# Patient Record
Sex: FEMALE | Race: BLACK OR AFRICAN AMERICAN | Employment: UNEMPLOYED | URBAN - METROPOLITAN AREA
[De-identification: names, ages, dates, MRNs, and addresses within clinical notes are randomized per-mention and may not be internally consistent; named-entity substitution may affect disease eponyms.]

---

## 2017-01-12 ENCOUNTER — OFFICE VISIT (OUTPATIENT)
Dept: SURGERY | Age: 52
End: 2017-01-12

## 2017-01-12 VITALS
HEART RATE: 77 BPM | SYSTOLIC BLOOD PRESSURE: 149 MMHG | DIASTOLIC BLOOD PRESSURE: 78 MMHG | BODY MASS INDEX: 42.33 KG/M2 | WEIGHT: 210 LBS | HEIGHT: 59 IN | OXYGEN SATURATION: 100 %

## 2017-01-12 DIAGNOSIS — I10 ESSENTIAL HYPERTENSION: ICD-10-CM

## 2017-01-12 DIAGNOSIS — K21.9 GASTROESOPHAGEAL REFLUX DISEASE WITHOUT ESOPHAGITIS: ICD-10-CM

## 2017-01-12 DIAGNOSIS — Z98.84 S/P BARIATRIC SURGERY: ICD-10-CM

## 2017-01-12 DIAGNOSIS — R63.5 WEIGHT GAIN: ICD-10-CM

## 2017-01-12 DIAGNOSIS — L67.9 HAIR CHANGES: ICD-10-CM

## 2017-01-12 DIAGNOSIS — K90.9 INTESTINAL MALABSORPTION, UNSPECIFIED TYPE: Primary | ICD-10-CM

## 2017-01-12 NOTE — PATIENT INSTRUCTIONS
Continue to monitor carbohydrate and protein intake. Eat regularly - every 3-4 hours. Add protein shake daily  The best bars are pure protein, atkins, Look for protein grams higher than carb grams. Remember to stay hydrated  Continue to take vitamins  Continue to work towards exercise goals  Continue regular follow-up with PCP  Phone call with NP in 2-3 weeks. Return in 2 months  Call office with any future questions or concerns         Body Mass Index: Care Instructions  Your Care Instructions    Body mass index (BMI) can help you see if your weight is raising your risk for health problems. It uses a formula to compare how much you weigh with how tall you are. A BMI between 18.5 and 24.9 is considered healthy. A BMI between 25 and 29.9 is considered overweight. A BMI of 30 or higher is considered obese. If your BMI is in the normal range, it means that you have a lower risk for weight-related health problems. If your BMI is in the overweight or obese range, you may be at increased risk for weight-related health problems, such as high blood pressure, heart disease, stroke, arthritis or joint pain, and diabetes. BMI is just one measure of your risk for weight-related health problems. You may be at higher risk for health problems if you are not active, you eat an unhealthy diet, or you drink too much alcohol or use tobacco products. Follow-up care is a key part of your treatment and safety. Be sure to make and go to all appointments, and call your doctor if you are having problems. It's also a good idea to know your test results and keep a list of the medicines you take. How can you care for yourself at home? · Practice healthy eating habits. This includes eating plenty of fruits, vegetables, whole grains, lean protein, and low-fat dairy. · Get at least 30 minutes of exercise 5 days a week or more. Brisk walking is a good choice.  You also may want to do other activities, such as running, swimming, cycling, or playing tennis or team sports. · Do not smoke. Smoking can increase your risk for health problems. If you need help quitting, talk to your doctor about stop-smoking programs and medicines. These can increase your chances of quitting for good. · Limit alcohol to 2 drinks a day for men and 1 drink a day for women. Too much alcohol can cause health problems. If you have a BMI higher than 25  · Your doctor may do other tests to check your risk for weight-related health problems. This may include measuring the distance around your waist. A waist measurement of more than 40 inches in men or 35 inches in women can increase the risk of weight-related health problems. · Talk with your doctor about steps you can take to stay healthy or improve your health. You may need to make lifestyle changes to lose weight and stay healthy, such as changing your diet and getting regular exercise. Where can you learn more? Go to http://faisal-mason.info/. Enter S176 in the search box to learn more about \"Body Mass Index: Care Instructions. \"  Current as of: February 16, 2016  Content Version: 11.1  © 8287-1951 [employer]. Care instructions adapted under license by [employer] (which disclaims liability or warranty for this information). If you have questions about a medical condition or this instruction, always ask your healthcare professional. [address] any warranty or liability for your use of this information.

## 2017-01-12 NOTE — PROGRESS NOTES
Subjective:     Nir Riddle  is a 49 y.o. female who presents for follow-up about 8 months following laparoscopic sleeve gastrectomy. Surgery related complication: NA. She has lost a total of 54 pounds since surgery. Body mass index is 42.41 kg/(m^2). Katherene Means Loss of EBW 36%. The patient presents today to assess their progress toward their weight loss goal & to address any issues that may be present:   She has gained 4 lbs since her last office visit on 11/11/16. Reports dietary indiscretion over the holidays. She is tolerating solids without difficulty and denies vomiting and abdominal pain. Concerned with recent hair breaking. Fluid intake:  good  Protein intake:  Poor - about 40g/day. All food  Meals/day:  2. Diet recall estimates calories <600/day. Taking vitamins as recommended. The patient's exercise level: not active. Changes in her medical history and medications have been reviewed:  Continues with left hip and rt knee pain. Uses a cane. Continues in pain management - having nerve blocks which pt states have helped. Routine office visit with PCP yesterday - who also checked TSH because of hair concerns. Remains on HBP rx.   Also with increased stress - mother just had 2 strokes    Patient Active Problem List   Diagnosis Code    Hypertension I10    Hypercholesteremia E78.00    Arthritis of knee, left M19.90    Chronic back pain M54.9, G89.29    Arthritis of right hip M19.90    GERD (gastroesophageal reflux disease) K21.9    Morbid obesity (HCC) E66.01    S/P bariatric surgery Z98.84    Intestinal malabsorption K90.9    Chronic gastritis K29.50    H. pylori infection A04.8     Past Medical History   Diagnosis Date    Arthritis of knee, left     Arthritis of right hip     Chronic back pain     Diabetes mellitus (HCC)     Fibromyalgia     GERD (gastroesophageal reflux disease)     Hypercholesteremia     Hypertension     Intestinal malabsorption     Morbid obesity ([facility]     Morbid obesity with body mass index of 50.0-59.9 in adult [facility])     Status post bariatric surgery 2016     sleeve resection / terracina     Past Surgical History   Procedure Laterality Date    Hx gyn       RICCI    Hx gyn       c section x 3    Hx hernia repair       In  incision with mesh    Laparoscopy abdomen diagnostic       aborted gastric bypass / terracina    Hx gi  2016     sleeve resection / terracina     Current Outpatient Prescriptions   Medication Sig Dispense Refill    TRAMADOL HCL (TRAMADOL PO) Take 50 mg by mouth.  b complex vitamins (B-50 COMPLEX) tablet Take 1 Tab by mouth daily.  Biotin 2,500 mcg cap Take  by mouth.  losartan-hydrochlorothiazide (HYZAAR) 100-25 mg per tablet Take 1 Tab by mouth daily.  cyanocobalamin (VITAMIN B-12) 1,000 mcg sublingual tablet Take 1,000 mcg by mouth daily.  calcium citrate-vitamin d3 (CITRACAL+D) 315-200 mg-unit tab Take 1 Tab by mouth daily (with breakfast).  gabapentin (NEURONTIN) 300 mg capsule Take 600 mg by mouth three (3) times daily.  simvastatin (ZOCOR) 20 mg tablet Take 20 mg by mouth nightly. Indications: HYPERCHOLESTEROLEMIA      multivitamin with iron (FLINTSTONES) chewable tablet Take 2 Tabs by mouth daily. Indications: VITAMIN DEFICIENCY PREVENTION      cholecalciferol (VITAMIN D3) 1,000 unit cap Take  by mouth daily.          Review of Systems:  General - Denies fatigue, fever, chills  Cardiac - Denies chest pain, palpitations, shortness of breath  Pulmonary - Denies shortness of breath or productive cough  Gastrointestinal - as noted above  Musculoskeletal - as noted above  Hematologic - Denies abnormal bleeding or bruising  Neurologic - Denies paralysis, numbness, tingling    Objective:     Visit Vitals    /78 (BP 1 Location: Left arm, BP Patient Position: Sitting)    Pulse 77    Ht 4' 11\" (1.499 m)    Wt 95.3 kg (210 lb)    SpO2 100%    BMI 42.41 kg/m2        Physical Exam:    General:  alert, cooperative, no distress, appears stated age   Lungs:   clear to auscultation bilaterally   Heart:  Regular rate and rhythm   Abdomen:   abdomen is soft without tenderness, masses, organomegaly or guarding; Active bowel sounds all 4 quadrants. Large hanging abdominal pannus. Incisions:  healed         Labs:    Previously reviewed with pt - all WNL. Recent Results (from the past 2016 hour(s))   VITAMIN B12    Collection Time: 11/11/16  8:15 AM   Result Value Ref Range    Vitamin B12 1842 (H) 211 - 911 pg/mL   CBC WITH AUTOMATED DIFF    Collection Time: 11/11/16  8:15 AM   Result Value Ref Range    WBC 3.5 (L) 4.6 - 13.2 K/uL    RBC 4.39 4.20 - 5.30 M/uL    HGB 12.1 12.0 - 16.0 g/dL    HCT 37.1 35.0 - 45.0 %    MCV 84.5 74.0 - 97.0 FL    MCH 27.6 24.0 - 34.0 PG    MCHC 32.6 31.0 - 37.0 g/dL    RDW 14.6 (H) 11.6 - 14.5 %    PLATELET 738 609 - 146 K/uL    MPV 11.5 9.2 - 11.8 FL    NEUTROPHILS 36 (L) 40 - 73 %    LYMPHOCYTES 49 21 - 52 %    MONOCYTES 9 3 - 10 %    EOSINOPHILS 4 0 - 5 %    BASOPHILS 2 0 - 2 %    ABS. NEUTROPHILS 1.3 (L) 1.8 - 8.0 K/UL    ABS. LYMPHOCYTES 1.8 0.9 - 3.6 K/UL    ABS. MONOCYTES 0.3 0.05 - 1.2 K/UL    ABS. EOSINOPHILS 0.1 0.0 - 0.4 K/UL    ABS.  BASOPHILS 0.1 (H) 0.0 - 0.06 K/UL    DF AUTOMATED     FERRITIN    Collection Time: 11/11/16  8:15 AM   Result Value Ref Range    Ferritin 48 8 - 388 NG/ML   FOLATE    Collection Time: 11/11/16  8:15 AM   Result Value Ref Range    Folate >20.0 (H) 3.10 - 17.50 ng/mL   IRON    Collection Time: 11/11/16  8:15 AM   Result Value Ref Range    Iron 92 50 - 577 ug/dL   METABOLIC PANEL, COMPREHENSIVE    Collection Time: 11/11/16  8:15 AM   Result Value Ref Range    Sodium 144 136 - 145 mmol/L    Potassium 4.6 3.5 - 5.5 mmol/L    Chloride 105 100 - 108 mmol/L    CO2 33 (H) 21 - 32 mmol/L    Anion gap 6 3.0 - 18 mmol/L    Glucose 97 74 - 99 mg/dL    BUN 7 7.0 - 18 MG/DL    Creatinine 0.81 0.6 - 1.3 MG/DL    BUN/Creatinine ratio 9 (L) 12 - 20      GFR est AA >60 >60 ml/min/1.73m2    GFR est non-AA >60 >60 ml/min/1.73m2    Calcium 9.8 8.5 - 10.1 MG/DL    Bilirubin, total 0.6 0.2 - 1.0 MG/DL    ALT 19 13 - 56 U/L    AST 20 15 - 37 U/L    Alk. phosphatase 79 45 - 117 U/L    Protein, total 7.2 6.4 - 8.2 g/dL    Albumin 3.9 3.4 - 5.0 g/dL    Globulin 3.3 2.0 - 4.0 g/dL    A-G Ratio 1.2 0.8 - 1.7     VITAMIN B1, WHOLE BLOOD    Collection Time: 11/11/16  8:15 AM   Result Value Ref Range    Vitamin B1 164.8 66.5 - 200.0 nmol/L   VITAMIN D, 25 HYDROXY    Collection Time: 11/11/16  8:16 AM   Result Value Ref Range    Vitamin D 25-Hydroxy 43.6 30 - 100 ng/mL         Assessment:     1. History of Morbid obesity, status post  laparoscopic sleeve gastrectomy with recent wt gain due to dietary issues. 2. HBP - on rx  3. GERD - asymptomatic off omeprazole. 4.  Protein calorie deficit  5. Hair breaking    Plan:       1. Today the patient & I have reviewed their weight loss and their diet - including how appropriate their weight loss and food choices are. Discussed factors contributing to her wt gain - including her diet and stress  2. To continue focus on hydration. 3. Discussed her hair issues and probable relationship with poor diet. To continue fu with PCP  4. Discussed protein calorie deficit at length with pt. Reminded to measure portions, continue high protein,low carbohydrate diet. Reminded to eat regularly. To add protein shake daily. Handouts given. Pt verbalized understanding of diet. Dietician support offered. 5. Reminded of importance of vitamin supplements. 6. To continue to increase activity. 7. Encouraged to attend support group. 8. To continue current rx. To continue follow-up with PCP, specialists. 9. I have discussed the plan and education material with patient. Pt verbalized understanding. 10. TC with NP in 2-3 weeks. Follow-up in 2 month(s). To call if questions/concerns prior to next office visit  11.  Total time spent with pt - 30 minutes with 20 minutes spent in counseling. Ms. Adenike Paulino has a reminder for a \"due or due soon\" health maintenance. I have asked that she contact her primary care provider for follow-up on this health maintenance.

## 2017-01-12 NOTE — MR AVS SNAPSHOT
Visit Information Date & Time Provider Department Dept. Phone Encounter #  
 1/12/2017  9:00 AM Brent Turpin NP [facility] [id number] Follow-up Instructions Return in about 2 months (around 3/12/2017). Your Appointments 1/24/2017 11:00 AM  
Follow Up with Brent Turpin NP [facility] [place] ([facility]) Appt Note: f/u phone [phone] [facility] [address] [facility] [id number]  
[phone]  
  
   
 [address] [address]  
  
    
 3/14/2017  9:00 AM  
Office Visit with Brent Turpin NP [facility] [place] ([facility]) Appt Note: f/u  
 [address] [facility] [address] [facility] [address]  
  
   
 [address] [place] [address] Upcoming Health Maintenance Date Due  
 LIPID PANEL Q1 1966 FOOT EXAM Q1 10/22/1976 MICROALBUMIN Q1 10/22/1976 EYE EXAM RETINAL OR DILATED Q1 10/22/1976 DTaP/Tdap/Td series (1 - Tdap) 10/22/1987 PAP AKA CERVICAL CYTOLOGY 10/22/1987 INFLUENZA AGE 9 TO ADULT 8/1/2016 HEMOGLOBIN A1C Q6M 10/11/2016 BREAST CANCER SCRN MAMMOGRAM 10/22/2016 FOBT Q 1 YEAR AGE 50-75 10/22/2016 Allergies as of 1/12/2017  Review Complete On: 1/12/2017 By: Israel Jeong LPN Severity Noted Reaction Type Reactions Aspirin  04/05/2016    Rash Vicodin [Hydrocodone-acetaminophen]  09/23/2015    Hives Current Immunizations  Reviewed on 5/19/2016 Name Date Pneumococcal Vaccine (Unspecified Type) 10/1/2015 Not reviewed this visit You Were Diagnosed With   
  
 Codes Comments Intestinal malabsorption, unspecified type    -  Primary ICD-10-CM: K90.9 ICD-9-CM: 579.9 S/P bariatric surgery     ICD-10-CM: Z98.84 ICD-9-CM: V45.86 Essential hypertension     ICD-10-CM: I10 
ICD-9-CM: 401.9 Gastroesophageal reflux disease without esophagitis     ICD-10-CM: K21.9 ICD-9-CM: 530.81 Weight gain     ICD-10-CM: R63.5 ICD-9-CM: 783.1 Vitals BP Pulse Height(growth percentile) Weight(growth percentile) SpO2 BMI  
 149/78 (BP 1 Location: Left arm, BP Patient Position: Sitting) 77 4' 11\" (1.499 m) 210 lb (95.3 kg) 100% 42.41 kg/m2 OB Status Smoking Status Hysterectomy Former Smoker BMI and BSA Data Body Mass Index Body Surface Area  
 42.41 kg/m 2 1.99 m 2 Preferred Pharmacy Pharmacy Name Phone [id number], [address] [phone] Your Updated Medication List  
  
   
This list is accurate as of: 1/12/17  9:22 AM.  Always use your most recent med list.  
  
  
  
  
 B-50 COMPLEX tablet Generic drug:  b complex vitamins Take 1 Tab by mouth daily. Biotin 2,500 mcg Cap Take  by mouth.  
  
 calcium citrate-vitamin d3 315-200 mg-unit Tab Commonly known as:  CITRACAL+D Take 1 Tab by mouth daily (with breakfast). cyanocobalamin 1,000 mcg sublingual tablet Commonly known as:  VITAMIN B-12 Take 1,000 mcg by mouth daily. docusate sodium 100 mg capsule Commonly known as:  Joann Venessa Take 100 mg by mouth two (2) times a day.  
  
 gabapentin 300 mg capsule Commonly known as:  NEURONTIN Take 600 mg by mouth three (3) times daily. losartan-hydroCHLOROthiazide 100-25 mg per tablet Commonly known as:  HYZAAR Take 1 Tab by mouth daily. multivitamin with iron chewable tablet Commonly known as:  Yulia Damion Take 2 Tabs by mouth daily. Indications: VITAMIN DEFICIENCY PREVENTION  
  
 omeprazole 20 mg capsule Commonly known as:  PRILOSEC Take 20 mg by mouth daily. Indications: GASTROESOPHAGEAL REFLUX  
  
 oxyCODONE-acetaminophen  mg per tablet Commonly known as:  PERCOCET 10 Take 1 Tab by mouth as needed. simvastatin 20 mg tablet Commonly known as:  ZOCOR Take 20 mg by mouth nightly. Indications: HYPERCHOLESTEROLEMIA TRAMADOL PO Take 50 mg by mouth. VITAMIN D3 1,000 unit Cap Generic drug:  cholecalciferol Take  by mouth daily. Follow-up Instructions Return in about 2 months (around 3/12/2017). Patient Instructions Continue to monitor carbohydrate and protein intake. Eat regularly - every 3-4 hours. Add protein shake daily The best bars are pure protein, atkins, Look for protein grams higher than carb grams. Remember to stay hydrated Continue to take vitamins Continue to work towards exercise goals Continue regular follow-up with PCP Phone call with NP in 2-3 weeks. Return in 2 months Call office with any future questions or concerns Body Mass Index: Care Instructions Your Care Instructions Body mass index (BMI) can help you see if your weight is raising your risk for health problems. It uses a formula to compare how much you weigh with how tall you are. A BMI between 18.5 and 24.9 is considered healthy. A BMI between 25 and 29.9 is considered overweight. A BMI of 30 or higher is considered obese. If your BMI is in the normal range, it means that you have a lower risk for weight-related health problems. If your BMI is in the overweight or obese range, you may be at increased risk for weight-related health problems, such as high blood pressure, heart disease, stroke, arthritis or joint pain, and diabetes. BMI is just one measure of your risk for weight-related health problems. You may be at higher risk for health problems if you are not active, you eat an unhealthy diet, or you drink too much alcohol or use tobacco products. Follow-up care is a key part of your treatment and safety. Be sure to make and go to all appointments, and call your doctor if you are having problems. It's also a good idea to know your test results and keep a list of the medicines you take. How can you care for yourself at home? · Practice healthy eating habits. This includes eating plenty of fruits, vegetables, whole grains, lean protein, and low-fat dairy. · Get at least 30 minutes of exercise 5 days a week or more. Brisk walking is a good choice. You also may want to do other activities, such as running, swimming, cycling, or playing tennis or team sports. · Do not smoke. Smoking can increase your risk for health problems. If you need help quitting, talk to your doctor about stop-smoking programs and medicines. These can increase your chances of quitting for good. · Limit alcohol to 2 drinks a day for men and 1 drink a day for women. Too much alcohol can cause health problems. If you have a BMI higher than 25 · Your doctor may do other tests to check your risk for weight-related health problems. This may include measuring the distance around your waist. A waist measurement of more than 40 inches in men or 35 inches in women can increase the risk of weight-related health problems. · Talk with your doctor about steps you can take to stay healthy or improve your health. You may need to make lifestyle changes to lose weight and stay healthy, such as changing your diet and getting regular exercise. Where can you learn more? Go to http://faisal-mason.info/. Enter S176 in the search box to learn more about \"Body Mass Index: Care Instructions. \" Current as of: February 16, 2016 Content Version: 11.1 © 5499-9696 [employer]. Care instructions adapted under license by [employer] (which disclaims liability or warranty for this information). If you have questions about a medical condition or this instruction, always ask your healthcare professional. [address] any warranty or liability for your use of this information. Introducing [facility]! Dear Jose Polo: Thank you for requesting a Room Choice account. Our records indicate that you already have an active Room Choice account. You can access your account anytime at [email or web address] Did you know that you can access your hospital and ER discharge instructions at any time in Room Choice? You can also review all of your test results from your hospital stay or ER visit. Additional Information If you have questions, please visit the Frequently Asked Questions section of the Room Choice website at [email or web address] Remember, Room Choice is NOT to be used for urgent needs. For medical emergencies, dial 911. Now available from your iPhone and Android! Please provide this summary of care documentation to your next provider. Your primary care clinician is listed as Cris Escamilla. If you have any questions after today's visit, please call [phone].

## 2017-01-31 ENCOUNTER — TELEPHONE (OUTPATIENT)
Dept: SURGERY | Age: 52
End: 2017-01-31

## 2017-01-31 NOTE — TELEPHONE ENCOUNTER
TC to pt who is 9m s/p sleeve gastrectomy as follow-up to OV 1/12/17 where pt had protein calorie deficit. 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 Taking vitamins as recommended. PLAN:  To continue plan as outlined at last office visit   To keep upcoming appt 3/14/17   To call if questions/concerns prior to appt.   Carlee LADD

## 2017-03-14 ENCOUNTER — OFFICE VISIT (OUTPATIENT)
Dept: SURGERY | Age: 52
End: 2017-03-14

## 2017-03-14 DIAGNOSIS — I10 ESSENTIAL HYPERTENSION: ICD-10-CM

## 2017-03-14 DIAGNOSIS — K90.9 INTESTINAL MALABSORPTION, UNSPECIFIED TYPE: Primary | ICD-10-CM

## 2017-03-14 DIAGNOSIS — Z98.84 S/P BARIATRIC SURGERY: ICD-10-CM

## 2017-03-14 NOTE — PATIENT INSTRUCTIONS
Continue to monitor carbohydrate and protein intake. Eat regularly. Remember to stay hydrated  Continue to take vitamins  Continue to work towards exercise goals  Continue regular follow-up with PCP  Return in 3 months. Get labs prior to next office visit. Call office with any future questions or concerns         Body Mass Index: Care Instructions  Your Care Instructions    Body mass index (BMI) can help you see if your weight is raising your risk for health problems. It uses a formula to compare how much you weigh with how tall you are. A BMI between 18.5 and 24.9 is considered healthy. A BMI between 25 and 29.9 is considered overweight. A BMI of 30 or higher is considered obese. If your BMI is in the normal range, it means that you have a lower risk for weight-related health problems. If your BMI is in the overweight or obese range, you may be at increased risk for weight-related health problems, such as high blood pressure, heart disease, stroke, arthritis or joint pain, and diabetes. BMI is just one measure of your risk for weight-related health problems. You may be at higher risk for health problems if you are not active, you eat an unhealthy diet, or you drink too much alcohol or use tobacco products. Follow-up care is a key part of your treatment and safety. Be sure to make and go to all appointments, and call your doctor if you are having problems. It's also a good idea to know your test results and keep a list of the medicines you take. How can you care for yourself at home? · Practice healthy eating habits. This includes eating plenty of fruits, vegetables, whole grains, lean protein, and low-fat dairy. · Get at least 30 minutes of exercise 5 days a week or more. Brisk walking is a good choice. You also may want to do other activities, such as running, swimming, cycling, or playing tennis or team sports. · Do not smoke. Smoking can increase your risk for health problems.  If you need help quitting, talk to your doctor about stop-smoking programs and medicines. These can increase your chances of quitting for good. · Limit alcohol to 2 drinks a day for men and 1 drink a day for women. Too much alcohol can cause health problems. If you have a BMI higher than 25  · Your doctor may do other tests to check your risk for weight-related health problems. This may include measuring the distance around your waist. A waist measurement of more than 40 inches in men or 35 inches in women can increase the risk of weight-related health problems. · Talk with your doctor about steps you can take to stay healthy or improve your health. You may need to make lifestyle changes to lose weight and stay healthy, such as changing your diet and getting regular exercise. Where can you learn more? Go to http://faisal-mason.info/. Enter S176 in the search box to learn more about \"Body Mass Index: Care Instructions. \"  Current as of: February 16, 2016  Content Version: 11.1  © 8819-3744 [employer]. Care instructions adapted under license by [employer] (which disclaims liability or warranty for this information). If you have questions about a medical condition or this instruction, always ask your healthcare professional. [address] any warranty or liability for your use of this information.

## 2017-03-14 NOTE — PROGRESS NOTES
Subjective:   Lee Calderon  is a 49 y.o. female who presents for follow-up about 10 months following laparoscopic sleeve gastrectomy. Surgery related complication: NA. She has lost a total of 57 pounds since surgery. Body mass index is 41.81 kg/(m^2). Loss of EBW is 38%. The patient presents today to assess their progress toward their weight loss goal & to address any issues that may be present:  She has lost 3 lbs since her last office visit 1/12/17. She is tolerating solids without difficulty and denies vomiting and abdominal pain. Fluid intake:  good                                                Protein intake: good - food + occasional protein shakes  Meals/day: 3+. Much improved over last office visit. Taking vitamins as recommended. The patient's exercise level: not active due to pain from left hip, rt knee, fibromyalgia. Uses a cane. Changes in her medical history and medications have been reviewed:  Had appt with pain management last week, has appt with foot/ankle specialist today, has upcoming appt rheumatologist next month. Remains under increased stress - her Mother is now in rehab after 2 strokes. Reports recent OV PCP. States TSH level WNL.     Patient Active Problem List   Diagnosis Code    Hypertension I10    Hypercholesteremia E78.00    Arthritis of knee, left M19.90    Chronic back pain M54.9, G89.29    Arthritis of right hip M19.90    GERD (gastroesophageal reflux disease) K21.9    Morbid obesity (HCC) E66.01    S/P bariatric surgery Z98.84    Intestinal malabsorption K90.9    Chronic gastritis K29.50    H. pylori infection A04.8     Past Medical History:   Diagnosis Date    Arthritis of knee, left     Arthritis of right hip     Chronic back pain     Diabetes mellitus (HCC)     Fibromyalgia     GERD (gastroesophageal reflux disease)     Hypercholesteremia     Hypertension     Intestinal malabsorption     Morbid obesity ([facility]     Morbid obesity with body mass index of 50.0-59.9 in adult [facility])     Status post bariatric surgery 2016    sleeve resection / terracina     Past Surgical History:   Procedure Laterality Date    HX GI  2016    sleeve resection / terracina    HX GYN      RICCI    HX GYN      c section x 3    HX HERNIA REPAIR      In  incision with mesh    LAPAROSCOPY ABDOMEN DIAGNOSTIC      aborted gastric bypass / terracina     Current Outpatient Prescriptions   Medication Sig Dispense Refill    TRAMADOL HCL (TRAMADOL PO) Take 50 mg by mouth.  cholecalciferol (VITAMIN D3) 1,000 unit cap Take  by mouth daily.  b complex vitamins (B-50 COMPLEX) tablet Take 1 Tab by mouth daily.  losartan-hydrochlorothiazide (HYZAAR) 100-25 mg per tablet Take 1 Tab by mouth daily.  cyanocobalamin (VITAMIN B-12) 1,000 mcg sublingual tablet Take 1,000 mcg by mouth daily.  calcium citrate-vitamin d3 (CITRACAL+D) 315-200 mg-unit tab Take 1 Tab by mouth daily (with breakfast).  gabapentin (NEURONTIN) 300 mg capsule Take 600 mg by mouth three (3) times daily.  multivitamin with iron (FLINTSTONES) chewable tablet Take 2 Tabs by mouth daily. Indications: VITAMIN DEFICIENCY PREVENTION      Biotin 2,500 mcg cap Take  by mouth. Review of Systems:  General - Denies fever or chills  Cardiac - Denies chest pain, palpitations or shortness of breath  Pulmonary - Denies shortness of breath or productive cough  Gastrointestinal - as noted above  Musculoskeletal - + generalized pain - especially left hip, rt knee. Hematologic - Denies abnormal bleeding or bruising  Neurologic -Recent increased fibromyalgia symptoms.   Denies paralysis, numbness, tingling    Objective:     Visit Vitals    /88 (BP 1 Location: Right arm, BP Patient Position: Sitting)    Pulse (!) 110    Ht 4' 11\" (1.499 m)    Wt 93.9 kg (207 lb)    SpO2 100%    BMI 41.81 kg/m2        Physical Exam:    General:  alert, cooperative, no distress, appears stated age   Lungs:   clear to auscultation bilaterally   Heart:  Regular rate and rhythm   Abdomen:   abdomen is soft without tenderness, masses, organomegaly or guarding; Active bowel sounds all 4 quadrants. Incisions:  healed         Assessment:     1. History of Morbid obesity, status post  laparoscopic sleeve gastrectomy with slow, but improving wt loss. 2. HBP - on rx    Plan:       1. Today the patient & I have reviewed their weight loss and their diet - including how appropriate their weight loss and food choices are. Discussed strategies that are slowing wt loss & strategies to promote additional wt loss. 2. To continue focus on hydration. 3. Reminded to measure portions, continue high protein, low carbohydrate diet. Reminded to eat regularly, to eat slowly & not to drink with meals. Pt verbalized understanding of diet. Reminded of availability of dietician support. 4.  Reminded of importance of vitamin supplements. 5. To continue to increase activity as able. 6. Encouraged to attend support group. 7. To continue current rx. To continue follow-up with PCP, specialists. 8. Reminded of labs needed @ 1y visit. Lab slip given to pt. 9. I have discussed this plan and education material with patient. Pt verbalized understanding. 10. Follow-up in 3 month(s). To call if questions/concerns prior to office visit. 11. Total time spent with pt - 30 minutes with 20 minutes spent in counseling. Ms. Diaz Espino has a reminder for a \"due or due soon\" health maintenance. I have asked that she contact her primary care provider for follow-up on this health maintenance.

## 2017-03-14 NOTE — MR AVS SNAPSHOT
Visit Information Date & Time Provider Department Dept. Phone Encounter #  
 3/14/2017  9:00 AM Henry Cat NP [employer] [facility] [phone] [id number] Follow-up Instructions Return in about 3 months (around 6/14/2017). Upcoming Health Maintenance Date Due  
 LIPID PANEL Q1 1966 FOOT EXAM Q1 10/22/1976 MICROALBUMIN Q1 10/22/1976 EYE EXAM RETINAL OR DILATED Q1 10/22/1976 DTaP/Tdap/Td series (1 - Tdap) 10/22/1987 PAP AKA CERVICAL CYTOLOGY 10/22/1987 INFLUENZA AGE 9 TO ADULT 8/1/2016 HEMOGLOBIN A1C Q6M 10/11/2016 BREAST CANCER SCRN MAMMOGRAM 10/22/2016 FOBT Q 1 YEAR AGE 50-75 10/22/2016 Allergies as of 3/14/2017  Review Complete On: 3/14/2017 By: Henry Cat NP Severity Noted Reaction Type Reactions Aspirin  04/05/2016    Rash Vicodin [Hydrocodone-acetaminophen]  09/23/2015    Hives Current Immunizations  Reviewed on 5/19/2016 Name Date Pneumococcal Vaccine (Unspecified Type) 10/1/2015 Not reviewed this visit You Were Diagnosed With   
  
 Codes Comments Intestinal malabsorption, unspecified type    -  Primary ICD-10-CM: K90.9 ICD-9-CM: 579.9 S/P bariatric surgery     ICD-10-CM: Z98.84 ICD-9-CM: V45.86 Essential hypertension     ICD-10-CM: I10 
ICD-9-CM: 401.9 Vitals BP Pulse Height(growth percentile) Weight(growth percentile) SpO2 BMI  
 172/88 (BP 1 Location: Right arm, BP Patient Position: Sitting) (!) 110 4' 11\" (1.499 m) 207 lb (93.9 kg) 100% 41.81 kg/m2 OB Status Smoking Status Hysterectomy Former Smoker BMI and BSA Data Body Mass Index Body Surface Area  
 41.81 kg/m 2 1.98 m 2 Preferred Pharmacy Pharmacy Name Phone [id number] [address] [phone] Your Updated Medication List  
  
   
This list is accurate as of: 3/14/17  9:33 AM.  Always use your most recent med list.  
  
  
  
  
 B-50 COMPLEX tablet Generic drug:  b complex vitamins Take 1 Tab by mouth daily. Biotin 2,500 mcg Cap Take  by mouth.  
  
 calcium citrate-vitamin d3 315-200 mg-unit Tab Commonly known as:  CITRACAL+D Take 1 Tab by mouth daily (with breakfast). cyanocobalamin 1,000 mcg sublingual tablet Commonly known as:  VITAMIN B-12 Take 1,000 mcg by mouth daily. gabapentin 300 mg capsule Commonly known as:  NEURONTIN Take 600 mg by mouth three (3) times daily. losartan-hydroCHLOROthiazide 100-25 mg per tablet Commonly known as:  HYZAAR Take 1 Tab by mouth daily. multivitamin with iron chewable tablet Commonly known as:  Ocie [place] Take 2 Tabs by mouth daily. Indications: VITAMIN DEFICIENCY PREVENTION  
  
 TRAMADOL PO Take 50 mg by mouth. VITAMIN D3 1,000 unit Cap Generic drug:  cholecalciferol Take  by mouth daily. Follow-up Instructions Return in about 3 months (around 6/14/2017). To-Do List   
 03/14/2017 Lab:  CBC WITH AUTOMATED DIFF   
  
 03/14/2017 Lab:  FERRITIN   
  
 03/14/2017 Lab:  IRON   
  
 03/14/2017 Lab:  METABOLIC PANEL, COMPREHENSIVE   
  
 03/14/2017 Lab:  VITAMIN B1, WHOLE BLOOD   
  
 03/14/2017 Lab:  VITAMIN B12 & FOLATE Patient Instructions Continue to monitor carbohydrate and protein intake. Eat regularly. Remember to stay hydrated Continue to take vitamins Continue to work towards exercise goals Continue regular follow-up with PCP Return in 3 months. Get labs prior to next office visit. Call office with any future questions or concerns Body Mass Index: Care Instructions Your Care Instructions Body mass index (BMI) can help you see if your weight is raising your risk for health problems. It uses a formula to compare how much you weigh with how tall you are. A BMI between 18.5 and 24.9 is considered healthy.  A BMI between 25 and 29.9 is considered overweight. A BMI of 30 or higher is considered obese. If your BMI is in the normal range, it means that you have a lower risk for weight-related health problems. If your BMI is in the overweight or obese range, you may be at increased risk for weight-related health problems, such as high blood pressure, heart disease, stroke, arthritis or joint pain, and diabetes. BMI is just one measure of your risk for weight-related health problems. You may be at higher risk for health problems if you are not active, you eat an unhealthy diet, or you drink too much alcohol or use tobacco products. Follow-up care is a key part of your treatment and safety. Be sure to make and go to all appointments, and call your doctor if you are having problems. It's also a good idea to know your test results and keep a list of the medicines you take. How can you care for yourself at home? · Practice healthy eating habits. This includes eating plenty of fruits, vegetables, whole grains, lean protein, and low-fat dairy. · Get at least 30 minutes of exercise 5 days a week or more. Brisk walking is a good choice. You also may want to do other activities, such as running, swimming, cycling, or playing tennis or team sports. · Do not smoke. Smoking can increase your risk for health problems. If you need help quitting, talk to your doctor about stop-smoking programs and medicines. These can increase your chances of quitting for good. · Limit alcohol to 2 drinks a day for men and 1 drink a day for women. Too much alcohol can cause health problems. If you have a BMI higher than 25 · Your doctor may do other tests to check your risk for weight-related health problems. This may include measuring the distance around your waist. A waist measurement of more than 40 inches in men or 35 inches in women can increase the risk of weight-related health problems. · Talk with your doctor about steps you can take to stay healthy or improve your health. You may need to make lifestyle changes to lose weight and stay healthy, such as changing your diet and getting regular exercise. Where can you learn more? Go to http://faisal-mason.info/. Enter S176 in the search box to learn more about \"Body Mass Index: Care Instructions. \" Current as of: February 16, 2016 Content Version: 11.1 © 1267-3446 Socialize. Care instructions adapted under license by Hukk[employer] (which disclaims liability or warranty for this information). If you have questions about a medical condition or this instruction, always ask your healthcare professional. [address] any warranty or liability for your use of this information. Introducing [facility] & HEALTH SERVICES! Dear Best Zapata: Thank you for requesting a [employer] account. Our records indicate that you already have an active [employer] account. You can access your account anytime at [email or web address] Did you know that you can access your hospital and ER discharge instructions at any time in [employer]chip? You can also review all of your test results from your hospital stay or ER visit. Additional Information If you have questions, please visit the Frequently Asked Questions section of the [employer] website at [email or web address] Remember, [employer] is NOT to be used for urgent needs. For medical emergencies, dial 911. Now available from your iPhone and Android! Please provide this summary of care documentation to your next provider. Your primary care clinician is listed as Cris Escamilla. If you have any questions after today's visit, please call [phone].

## 2017-03-16 VITALS
OXYGEN SATURATION: 100 % | BODY MASS INDEX: 41.73 KG/M2 | DIASTOLIC BLOOD PRESSURE: 88 MMHG | WEIGHT: 207 LBS | HEIGHT: 59 IN | SYSTOLIC BLOOD PRESSURE: 152 MMHG | HEART RATE: 110 BPM

## 2017-06-06 ENCOUNTER — HOSPITAL ENCOUNTER (OUTPATIENT)
Dept: LAB | Age: 52
Discharge: HOME | End: 2017-06-06
Payer: MEDICARE

## 2017-06-06 DIAGNOSIS — K90.9 INTESTINAL MALABSORPTION, UNSPECIFIED TYPE: ICD-10-CM

## 2017-06-06 DIAGNOSIS — Z98.84 S/P BARIATRIC SURGERY: ICD-10-CM

## 2017-06-06 DIAGNOSIS — I10 ESSENTIAL HYPERTENSION: ICD-10-CM

## 2017-06-06 LAB
ALBUMIN SERPL BCP-MCNC: 3.7 G/DL (ref 3.4–5)
ALBUMIN/GLOB SERPL: 1 {RATIO} (ref 0.8–1.7)
ALP SERPL-CCNC: 109 U/L (ref 45–117)
ALT SERPL-CCNC: 21 U/L (ref 13–56)
ANION GAP BLD CALC-SCNC: 5 MMOL/L (ref 3–18)
AST SERPL W P-5'-P-CCNC: 24 U/L (ref 15–37)
BASOPHILS # BLD AUTO: 0.1 K/UL (ref 0–0.06)
BASOPHILS # BLD: 2 % (ref 0–2)
BILIRUB SERPL-MCNC: 0.6 MG/DL (ref 0.2–1)
BUN SERPL-MCNC: 10 MG/DL (ref 7–18)
BUN/CREAT SERPL: 14 (ref 12–20)
CALCIUM SERPL-MCNC: 9.8 MG/DL (ref 8.5–10.1)
CHLORIDE SERPL-SCNC: 104 MMOL/L (ref 100–108)
CO2 SERPL-SCNC: 35 MMOL/L (ref 21–32)
CREAT SERPL-MCNC: 0.7 MG/DL (ref 0.6–1.3)
DIFFERENTIAL METHOD BLD: ABNORMAL
EOSINOPHIL # BLD: 0.2 K/UL (ref 0–0.4)
EOSINOPHIL NFR BLD: 5 % (ref 0–5)
ERYTHROCYTE [DISTWIDTH] IN BLOOD BY AUTOMATED COUNT: 14.3 % (ref 11.6–14.5)
FERRITIN SERPL-MCNC: 62 NG/ML (ref 8–388)
FOLATE SERPL-MCNC: >20 NG/ML (ref 3.1–17.5)
GLOBULIN SER CALC-MCNC: 3.7 G/DL (ref 2–4)
GLUCOSE SERPL-MCNC: 89 MG/DL (ref 74–99)
HCT VFR BLD AUTO: 38.5 % (ref 35–45)
HGB BLD-MCNC: 12.7 G/DL (ref 12–16)
IRON SERPL-MCNC: 95 UG/DL (ref 50–175)
LYMPHOCYTES # BLD AUTO: 52 % (ref 21–52)
LYMPHOCYTES # BLD: 1.9 K/UL (ref 0.9–3.6)
MCH RBC QN AUTO: 28.3 PG (ref 24–34)
MCHC RBC AUTO-ENTMCNC: 33 G/DL (ref 31–37)
MCV RBC AUTO: 85.7 FL (ref 74–97)
MONOCYTES # BLD: 0.4 K/UL (ref 0.05–1.2)
MONOCYTES NFR BLD AUTO: 10 % (ref 3–10)
NEUTS SEG # BLD: 1.2 K/UL (ref 1.8–8)
NEUTS SEG NFR BLD AUTO: 31 % (ref 40–73)
PLATELET # BLD AUTO: 189 K/UL (ref 135–420)
PMV BLD AUTO: 12.3 FL (ref 9.2–11.8)
POTASSIUM SERPL-SCNC: 4.7 MMOL/L (ref 3.5–5.5)
PROT SERPL-MCNC: 7.4 G/DL (ref 6.4–8.2)
RBC # BLD AUTO: 4.49 M/UL (ref 4.2–5.3)
SODIUM SERPL-SCNC: 144 MMOL/L (ref 136–145)
VIT B12 SERPL-MCNC: >2000 PG/ML (ref 211–911)
WBC # BLD AUTO: 3.7 K/UL (ref 4.6–13.2)

## 2017-06-06 PROCEDURE — 82607 VITAMIN B-12: CPT | Performed by: NURSE PRACTITIONER

## 2017-06-06 PROCEDURE — 85025 COMPLETE CBC W/AUTO DIFF WBC: CPT | Performed by: NURSE PRACTITIONER

## 2017-06-06 PROCEDURE — 84425 ASSAY OF VITAMIN B-1: CPT | Performed by: NURSE PRACTITIONER

## 2017-06-06 PROCEDURE — 82728 ASSAY OF FERRITIN: CPT | Performed by: NURSE PRACTITIONER

## 2017-06-06 PROCEDURE — 36415 COLL VENOUS BLD VENIPUNCTURE: CPT | Performed by: NURSE PRACTITIONER

## 2017-06-06 PROCEDURE — 83540 ASSAY OF IRON: CPT | Performed by: NURSE PRACTITIONER

## 2017-06-06 PROCEDURE — 80053 COMPREHEN METABOLIC PANEL: CPT | Performed by: NURSE PRACTITIONER

## 2017-06-08 LAB — VIT B1 BLD-SCNC: 184.5 NMOL/L (ref 66.5–200)

## 2017-06-08 NOTE — PROGRESS NOTES
1y s/p sleeve - has appt with Dr. Kingsley Benedict 6/15/17. Lela - please call & advise that all labs good & remind her of upcoming appt. Thanks.

## 2017-06-14 ENCOUNTER — OFFICE VISIT (OUTPATIENT)
Dept: SURGERY | Age: 52
End: 2017-06-14

## 2017-06-14 VITALS
BODY MASS INDEX: 40.72 KG/M2 | DIASTOLIC BLOOD PRESSURE: 86 MMHG | HEIGHT: 59 IN | HEART RATE: 70 BPM | RESPIRATION RATE: 16 BRPM | OXYGEN SATURATION: 100 % | SYSTOLIC BLOOD PRESSURE: 144 MMHG | WEIGHT: 202 LBS

## 2017-06-14 DIAGNOSIS — Z98.84 S/P BARIATRIC SURGERY: ICD-10-CM

## 2017-06-14 DIAGNOSIS — K90.9 INTESTINAL MALABSORPTION, UNSPECIFIED TYPE: Primary | ICD-10-CM

## 2017-06-14 RX ORDER — TIZANIDINE 4 MG/1
4 TABLET ORAL DAILY
COMMUNITY
Start: 2017-06-12 | End: 2018-06-22

## 2017-06-14 NOTE — MR AVS SNAPSHOT
Visit Information Date & Time Provider Department Dept. Phone Encounter #  
 6/14/2017  9:15 AM Gwen Guadalupe MD [facility] [phone] [id number] Your Appointments 12/20/2017  8:30 AM  
EST PATIENT PROBLEM with Gwen Guadalupe MD  
[address] [facility]) Appt Note: f/u  
 [address] [place] [address] [facility] [address]  
  
   
 [address] [place] [address] Upcoming Health Maintenance Date Due DTaP/Tdap/Td series (1 - Tdap) 10/22/1987 PAP AKA CERVICAL CYTOLOGY 10/22/1987 BREAST CANCER SCRN MAMMOGRAM 10/22/2016 FOBT Q 1 YEAR AGE 50-75 10/22/2016 INFLUENZA AGE 9 TO ADULT 8/1/2017 Allergies as of 6/14/2017  Review Complete On: 6/14/2017 By: Anjum Mendez Severity Noted Reaction Type Reactions Aspirin  04/05/2016    Rash Vicodin [Hydrocodone-acetaminophen]  09/23/2015    Hives Current Immunizations  Reviewed on 5/19/2016 Name Date Pneumococcal Vaccine (Unspecified Type) 10/1/2015 Not reviewed this visit You Were Diagnosed With   
  
 Codes Comments Intestinal malabsorption, unspecified type    -  Primary ICD-10-CM: K90.9 ICD-9-CM: 579.9 S/P bariatric surgery     ICD-10-CM: Z98.84 ICD-9-CM: V45.86 Vitals BP Pulse Resp Height(growth percentile) Weight(growth percentile) SpO2  
 144/86 (BP 1 Location: Left arm, BP Patient Position: Sitting) 70 16 4' 11\" (1.499 m) 202 lb (91.6 kg) 100% BMI OB Status Smoking Status 40.8 kg/m2 Hysterectomy Former Smoker Vitals History BMI and BSA Data Body Mass Index Body Surface Area  
 40.8 kg/m 2 1.95 m 2 Preferred Pharmacy Pharmacy Name Phone [id number] [address] [phone] Your Updated Medication List  
  
   
 This list is accurate as of: 6/14/17 10:07 AM.  Always use your most recent med list.  
  
  
  
  
 B-50 COMPLEX tablet Generic drug:  b complex vitamins Take 1 Tab by mouth daily. Biotin 2,500 mcg Cap Take  by mouth.  
  
 calcium citrate-vitamin d3 315-200 mg-unit Tab Commonly known as:  CITRACAL+D Take 1 Tab by mouth daily (with breakfast). cyanocobalamin 1,000 mcg sublingual tablet Commonly known as:  VITAMIN B-12 Take 1,000 mcg by mouth daily. gabapentin 300 mg capsule Commonly known as:  NEURONTIN Take 600 mg by mouth three (3) times daily. losartan-hydroCHLOROthiazide 100-25 mg per tablet Commonly known as:  HYZAAR Take 1 Tab by mouth daily. multivitamin with iron chewable tablet Commonly known as:  Yudi Patient Take 2 Tabs by mouth daily. Indications: VITAMIN DEFICIENCY PREVENTION  
  
 tiZANidine 4 mg tablet Commonly known as:  [place]ides Take 4 mg by mouth daily. TRAMADOL PO Take 50 mg by mouth. VITAMIN D3 1,000 unit Cap Generic drug:  cholecalciferol Take  by mouth daily. Patient Instructions Body Mass Index: Care Instructions Your Care Instructions Body mass index (BMI) can help you see if your weight is raising your risk for health problems. It uses a formula to compare how much you weigh with how tall you are. · A BMI lower than 18.5 is considered underweight. · A BMI between 18.5 and 24.9 is considered healthy. · A BMI between 25 and 29.9 is considered overweight. A BMI of 30 or higher is considered obese. If your BMI is in the normal range, it means that you have a lower risk for weight-related health problems. If your BMI is in the overweight or obese range, you may be at increased risk for weight-related health problems, such as high blood pressure, heart disease, stroke, arthritis or joint pain, and diabetes.  If your BMI is in the underweight range, you may be at increased risk for health problems such as fatigue, lower protection (immunity) against illness, muscle loss, bone loss, hair loss, and hormone problems. BMI is just one measure of your risk for weight-related health problems. You may be at higher risk for health problems if you are not active, you eat an unhealthy diet, or you drink too much alcohol or use tobacco products. Follow-up care is a key part of your treatment and safety. Be sure to make and go to all appointments, and call your doctor if you are having problems. It's also a good idea to know your test results and keep a list of the medicines you take. How can you care for yourself at home? · Practice healthy eating habits. This includes eating plenty of fruits, vegetables, whole grains, lean protein, and low-fat dairy. · If your doctor recommends it, get more exercise. Walking is a good choice. Bit by bit, increase the amount you walk every day. Try for at least 30 minutes on most days of the week. · Do not smoke. Smoking can increase your risk for health problems. If you need help quitting, talk to your doctor about stop-smoking programs and medicines. These can increase your chances of quitting for good. · Limit alcohol to 2 drinks a day for men and 1 drink a day for women. Too much alcohol can cause health problems. If you have a BMI higher than 25 · Your doctor may do other tests to check your risk for weight-related health problems. This may include measuring the distance around your waist. A waist measurement of more than 40 inches in men or 35 inches in women can increase the risk of weight-related health problems. · Talk with your doctor about steps you can take to stay healthy or improve your health. You may need to make lifestyle changes to lose weight and stay healthy, such as changing your diet and getting regular exercise. If you have a BMI lower than 18.5 · Your doctor may do other tests to check your risk for health problems. · Talk with your doctor about steps you can take to stay healthy or improve your health. You may need to make lifestyle changes to gain or maintain weight and stay healthy, such as getting more healthy foods in your diet and doing exercises to build muscle. Where can you learn more? Go to http://faisal-mason.info/. Enter S176 in the search box to learn more about \"Body Mass Index: Care Instructions. \" Current as of: January 23, 2017 Content Version: 11.2 © 2924-2823 eÃ“tica. Care instructions adapted under license by Vdolg (which disclaims liability or warranty for this information). If you have questions about a medical condition or this instruction, always ask your healthcare professional. [address] any warranty or liability for your use of this information. Introducing [facility] & HEALTH SERVICES! Dear Roby Lesch: Thank you for requesting a combionic account. Our records indicate that you already have an active combionic account. You can access your account anytime at [email or web address] Did you know that you can access your hospital and ER discharge instructions at any time in combionic? You can also review all of your test results from your hospital stay or ER visit. Additional Information If you have questions, please visit the Frequently Asked Questions section of the combionic website at [email or web address] Remember, combionic is NOT to be used for urgent needs. For medical emergencies, dial 911. Now available from your iPhone and Android! Please provide this summary of care documentation to your next provider. Your primary care clinician is listed as Sharmila Carbone. If you have any questions after today's visit, please call [phone].

## 2017-06-14 NOTE — PROGRESS NOTES
1+ year follow-up    Subjective:     Moris Britton  is a 49 y.o. female who presents for follow-up about 13 months following laparoscopic sleeve gastrectomy. She has lost a total   of 62 pounds since surgery. Body mass index is 40.8 kg/(m^2). . EBWL is (41%). The patient presents today to assess their progress toward their goal   of weight loss and to address any issues that may be present. Today the patient and I have reviewed their diet and how appropriate their food choices are. The following issues have been identified - none from a surgical standpoint. She was a 2 week regimen of prednisone of arthritis issues - she says has gained about 6 lbs during that time. Pain assessment - 0/10  . Surgery related complication: NA       She reports no real issues from a surgical standpoint and denies vomiting, abdominal pain, diarrhea and difficulty breathing. The patient's exercise level: not active due to arthritis issues    Changes in her medical history and medications have been reviewed.     Patient Active Problem List   Diagnosis Code    Hypertension I10    Hypercholesteremia E78.00    Arthritis of knee, left M17.12    Chronic back pain M54.9, G89.29    Arthritis of right hip M16.11    GERD (gastroesophageal reflux disease) K21.9    Morbid obesity (HCC) E66.01    S/P bariatric surgery Z98.84    Intestinal malabsorption K90.9    Chronic gastritis K29.50    H. pylori infection A04.8     Past Medical History:   Diagnosis Date    Arthritis of knee, left     Arthritis of right hip     Chronic back pain     Diabetes mellitus (HCC)     Fibromyalgia     GERD (gastroesophageal reflux disease)     Hypercholesteremia     Hypertension     Intestinal malabsorption     Morbid obesity ([address]     Morbid obesity with body mass index of 50.0-59.9 in adult [facility])     Status post bariatric surgery 5/2016    sleeve resection / terracina     Past Surgical History:   Procedure Laterality Date    HX GI  2016    sleeve resection / terracina    HX GYN      RICCI    HX GYN      c section x 3    HX HERNIA REPAIR      In  incision with mesh    LAPAROSCOPY ABDOMEN DIAGNOSTIC      aborted gastric bypass / terracina     Current Outpatient Prescriptions   Medication Sig Dispense Refill    tiZANidine (ZANAFLEX) 4 mg tablet Take 4 mg by mouth daily.  TRAMADOL HCL (TRAMADOL PO) Take 50 mg by mouth.  cholecalciferol (VITAMIN D3) 1,000 unit cap Take  by mouth daily.  b complex vitamins (B-50 COMPLEX) tablet Take 1 Tab by mouth daily.  Biotin 2,500 mcg cap Take  by mouth.  losartan-hydrochlorothiazide (HYZAAR) 100-25 mg per tablet Take 1 Tab by mouth daily.  cyanocobalamin (VITAMIN B-12) 1,000 mcg sublingual tablet Take 1,000 mcg by mouth daily.  calcium citrate-vitamin d3 (CITRACAL+D) 315-200 mg-unit tab Take 1 Tab by mouth daily (with breakfast).  multivitamin with iron (FLINTSTONES) chewable tablet Take 2 Tabs by mouth daily. Indications: VITAMIN DEFICIENCY PREVENTION      gabapentin (NEURONTIN) 300 mg capsule Take 600 mg by mouth three (3) times daily.          Review of Symptoms:     General - No history or complaints of unexpected fever or chills  Head/Neck - No history or complaints of headache or dizziness  Cardiac - No history or complaints of chest pain, palpitations, or shortness of breath  Pulmonary - No history or complaints of shortness of breath or productive cough  Gastrointestinal - as noted above  Genitourinary - No history or complaints of hematuria/dysuria or renal lithiasis  Musculoskeletal - No history or complaints of joint  muscular weakness  Hematologic - No history of any bleeding episodes  Neurologic - No history or complaints of  migraine headaches or neurologic symptoms    Objective:     Visit Vitals    /86 (BP 1 Location: Left arm, BP Patient Position: Sitting)    Pulse 70    Resp 16    Ht 4' 11\" (1.499 m)    Wt 91.6 kg (202 lb)  SpO2 100%    BMI 40.8 kg/m2        Physical Exam:    General:  alert, cooperative, no distress, appears stated age   Lungs:   clear to auscultation bilaterally   Heart:  Regular rate and rhythm   Abdomen:   abdomen is soft without significant tenderness, masses, organomegaly or guarding; Incisions: healing well, no significant drainage         Labs:     Recent Results (from the past 2016 hour(s))   CBC WITH AUTOMATED DIFF    Collection Time: 06/06/17  9:08 AM   Result Value Ref Range    WBC 3.7 (L) 4.6 - 13.2 K/uL    RBC 4.49 4.20 - 5.30 M/uL    HGB 12.7 12.0 - 16.0 g/dL    HCT 38.5 35.0 - 45.0 %    MCV 85.7 74.0 - 97.0 FL    MCH 28.3 24.0 - 34.0 PG    MCHC 33.0 31.0 - 37.0 g/dL    RDW 14.3 11.6 - 14.5 %    PLATELET 267 824 - 778 K/uL    MPV 12.3 (H) 9.2 - 11.8 FL    NEUTROPHILS 31 (L) 40 - 73 %    LYMPHOCYTES 52 21 - 52 %    MONOCYTES 10 3 - 10 %    EOSINOPHILS 5 0 - 5 %    BASOPHILS 2 0 - 2 %    ABS. NEUTROPHILS 1.2 (L) 1.8 - 8.0 K/UL    ABS. LYMPHOCYTES 1.9 0.9 - 3.6 K/UL    ABS. MONOCYTES 0.4 0.05 - 1.2 K/UL    ABS. EOSINOPHILS 0.2 0.0 - 0.4 K/UL    ABS. BASOPHILS 0.1 (H) 0.0 - 0.06 K/UL    DF AUTOMATED     FERRITIN    Collection Time: 06/06/17  9:08 AM   Result Value Ref Range    Ferritin 62 8 - 388 NG/ML   IRON    Collection Time: 06/06/17  9:08 AM   Result Value Ref Range    Iron 95 50 - 352 ug/dL   METABOLIC PANEL, COMPREHENSIVE    Collection Time: 06/06/17  9:08 AM   Result Value Ref Range    Sodium 144 136 - 145 mmol/L    Potassium 4.7 3.5 - 5.5 mmol/L    Chloride 104 100 - 108 mmol/L    CO2 35 (H) 21 - 32 mmol/L    Anion gap 5 3.0 - 18 mmol/L    Glucose 89 74 - 99 mg/dL    BUN 10 7.0 - 18 MG/DL    Creatinine 0.70 0.6 - 1.3 MG/DL    BUN/Creatinine ratio 14 12 - 20      GFR est AA >60 >60 ml/min/1.73m2    GFR est non-AA >60 >60 ml/min/1.73m2    Calcium 9.8 8.5 - 10.1 MG/DL    Bilirubin, total 0.6 0.2 - 1.0 MG/DL    ALT (SGPT) 21 13 - 56 U/L    AST (SGOT) 24 15 - 37 U/L    Alk.  phosphatase 109 45 - 117 U/L    Protein, total 7.4 6.4 - 8.2 g/dL    Albumin 3.7 3.4 - 5.0 g/dL    Globulin 3.7 2.0 - 4.0 g/dL    A-G Ratio 1.0 0.8 - 1.7     VITAMIN B1, WHOLE BLOOD    Collection Time: 06/06/17  9:08 AM   Result Value Ref Range    Vitamin B1 184.5 66.5 - 200.0 nmol/L   VITAMIN B12 & FOLATE    Collection Time: 06/06/17  9:08 AM   Result Value Ref Range    Vitamin B12 >2000 (H) 211 - 911 pg/mL    Folate >20.0 (H) 3.10 - 17.50 ng/mL       Assessment:     1. History of Morbid obesity, status post  laparoscopic sleeve gastrectomy. The patient has an upcoming right hip replacement with Dr. Clint Welsh next month. She has a PCP and cardiology visit soon to be cleared for her hip surgery. Above labs reviewed    She desires another 30 lbs of weight loss - she looks for being able to work out after her hip replacement     She will consider plastic surgery once she reaches her goal weight    Plan:     1. Remember to measure portions, continue low carbohydrate diet  2. Reviewed labs and appropriate changes made to vitamin regimen  3. Remember vitamin supplements - The importance of such was discussed regarding the malabsorptive issues that the surgery creates  4. Exercise regimen appears inadequate. 5. Attend support group  6. Follow-up in 6 month(s). 7. The patient understands the plan of action  8. Total time spent with the patient 30 minutes. I have reviewed the patients history and clinical findings with my physician extender in person. The patient has had all of their questions   answered today including both exercise and dietary issues. I have reviwed any relevant  data and agree with the current plan of action.           Patsi Frankel M.D.

## 2017-06-14 NOTE — PATIENT INSTRUCTIONS
Body Mass Index: Care Instructions  Your Care Instructions    Body mass index (BMI) can help you see if your weight is raising your risk for health problems. It uses a formula to compare how much you weigh with how tall you are. · A BMI lower than 18.5 is considered underweight. · A BMI between 18.5 and 24.9 is considered healthy. · A BMI between 25 and 29.9 is considered overweight. A BMI of 30 or higher is considered obese. If your BMI is in the normal range, it means that you have a lower risk for weight-related health problems. If your BMI is in the overweight or obese range, you may be at increased risk for weight-related health problems, such as high blood pressure, heart disease, stroke, arthritis or joint pain, and diabetes. If your BMI is in the underweight range, you may be at increased risk for health problems such as fatigue, lower protection (immunity) against illness, muscle loss, bone loss, hair loss, and hormone problems. BMI is just one measure of your risk for weight-related health problems. You may be at higher risk for health problems if you are not active, you eat an unhealthy diet, or you drink too much alcohol or use tobacco products. Follow-up care is a key part of your treatment and safety. Be sure to make and go to all appointments, and call your doctor if you are having problems. It's also a good idea to know your test results and keep a list of the medicines you take. How can you care for yourself at home? · Practice healthy eating habits. This includes eating plenty of fruits, vegetables, whole grains, lean protein, and low-fat dairy. · If your doctor recommends it, get more exercise. Walking is a good choice. Bit by bit, increase the amount you walk every day. Try for at least 30 minutes on most days of the week. · Do not smoke. Smoking can increase your risk for health problems. If you need help quitting, talk to your doctor about stop-smoking programs and medicines. These can increase your chances of quitting for good. · Limit alcohol to 2 drinks a day for men and 1 drink a day for women. Too much alcohol can cause health problems. If you have a BMI higher than 25  · Your doctor may do other tests to check your risk for weight-related health problems. This may include measuring the distance around your waist. A waist measurement of more than 40 inches in men or 35 inches in women can increase the risk of weight-related health problems. · Talk with your doctor about steps you can take to stay healthy or improve your health. You may need to make lifestyle changes to lose weight and stay healthy, such as changing your diet and getting regular exercise. If you have a BMI lower than 18.5  · Your doctor may do other tests to check your risk for health problems. · Talk with your doctor about steps you can take to stay healthy or improve your health. You may need to make lifestyle changes to gain or maintain weight and stay healthy, such as getting more healthy foods in your diet and doing exercises to build muscle. Where can you learn more? Go to http://faisal-mason.info/. Enter S176 in the search box to learn more about \"Body Mass Index: Care Instructions. \"  Current as of: January 23, 2017  Content Version: 11.2  © 9167-7031 [employer]. Care instructions adapted under license by [employer] (which disclaims liability or warranty for this information). If you have questions about a medical condition or this instruction, always ask your healthcare professional. [place] [address] any warranty or liability for your use of this information. Patient Instructions      1. Continue to monitor carbohydrate and protein intake- remember to keep your           total  carbohydrates to 50 grams or less per day for best results.   2. Remember hydration goals - usually 48 to 64 ounces of liquids per day  3. Continue to work towards exercise goals - minimum 3 days per week of 45          minutes to  1 hour at a time. 4. Remember to take vitamins as directed        Supplement Resource Guide    Importance of Protein:   Maintains lean body mass, produces antibodies to fight off infections, heals wounds, minimizes hair loss, helps to give you energy, helps with satiety, and keeping you full between meals. Importance of Calcium:  Needed for healthy bones and teeth, normal blood clotting, and nervous system functioning, higher risk of osteoporosis and bone disease with non-compliance. Importance of Multivitamins: Many functions. Supply you with extra nutrients that you may be missing from food. May lead to iron deficiency anemia, weakness, fatigue, and many other symptoms with non-compliance. Importance of B Vitamins:  Important for red blood cell formation, metabolism, energy, and helps to maintain a healthy nervous system. Protein Supplement  Find one you like now. Use immediately after surgery. Look for:  35-50g protein each day from your protein supplement once you reach the progression diet. 0-3 g fat per serving  0-3 g sugar per serving    Protein drinks should be split in separate dosages. Recommend: Lifelong  1 year + Calcium Supplement:     Start taking within a month after surgery. Look for: Calcium Citrate Plus D (1500 mg per day)  Recommend: Citracal     .          Avoid chocolate chewable calcium. Can use chewable bariatric or [employer] brand or similar chewable. The body cannot absorb more than 500-600 mg @ a time. Take for Life Multi-vitamin Supplement:      1st Month After Surgery: Any complete chewable, such as: [employer]s Complete chewables. Avoid [employer] sours or gummies. They lack iron and other important nutrients and also have added sugar.     Continue with chewable vitamin or change to adult complete multivitamin one month after surgery. Menstruating women can take a prenatal vitamin. Make sure has at least 18 mg iron and 839-631 mcg folic acid):    Vitamin B12, B Complex Vitamin, and Biotin  Start taking within a month after surgery. Vitamin B12:  1000 mcg of Vitamin B12 three times weekly    Must take sublingually (meaning you take it under your tongue) or in a liquid drop form for easy absorption. B Complex Vitamin: Take a pill or liquid drop form once daily. Biotin: This vitamin can help prevent hair loss.     Recommend 5mg   (5000 mcg) a day  Biotin is Optional

## 2017-12-20 ENCOUNTER — HOSPITAL ENCOUNTER (OUTPATIENT)
Dept: LAB | Age: 52
Discharge: HOME | End: 2017-12-20
Payer: MEDICARE

## 2017-12-20 ENCOUNTER — OFFICE VISIT (OUTPATIENT)
Dept: SURGERY | Age: 52
End: 2017-12-20

## 2017-12-20 VITALS
BODY MASS INDEX: 41.53 KG/M2 | HEIGHT: 59 IN | RESPIRATION RATE: 16 BRPM | SYSTOLIC BLOOD PRESSURE: 145 MMHG | OXYGEN SATURATION: 100 % | DIASTOLIC BLOOD PRESSURE: 76 MMHG | WEIGHT: 206 LBS | HEART RATE: 62 BPM

## 2017-12-20 DIAGNOSIS — K90.9 INTESTINAL MALABSORPTION, UNSPECIFIED TYPE: ICD-10-CM

## 2017-12-20 DIAGNOSIS — R10.30 LOWER ABDOMINAL PAIN: Primary | ICD-10-CM

## 2017-12-20 DIAGNOSIS — Z98.84 S/P BARIATRIC SURGERY: ICD-10-CM

## 2017-12-20 PROCEDURE — 87086 URINE CULTURE/COLONY COUNT: CPT | Performed by: SPECIALIST

## 2017-12-20 PROCEDURE — 36415 COLL VENOUS BLD VENIPUNCTURE: CPT | Performed by: SPECIALIST

## 2017-12-20 RX ORDER — DICYCLOMINE HYDROCHLORIDE 20 MG/1
20 TABLET ORAL
COMMUNITY
End: 2022-07-21

## 2017-12-20 RX ORDER — OMEPRAZOLE 20 MG/1
20 CAPSULE, DELAYED RELEASE ORAL DAILY
COMMUNITY

## 2017-12-20 NOTE — PATIENT INSTRUCTIONS
Body Mass Index: Care Instructions  Your Care Instructions    Body mass index (BMI) can help you see if your weight is raising your risk for health problems. It uses a formula to compare how much you weigh with how tall you are. · A BMI lower than 18.5 is considered underweight. · A BMI between 18.5 and 24.9 is considered healthy. · A BMI between 25 and 29.9 is considered overweight. A BMI of 30 or higher is considered obese. If your BMI is in the normal range, it means that you have a lower risk for weight-related health problems. If your BMI is in the overweight or obese range, you may be at increased risk for weight-related health problems, such as high blood pressure, heart disease, stroke, arthritis or joint pain, and diabetes. If your BMI is in the underweight range, you may be at increased risk for health problems such as fatigue, lower protection (immunity) against illness, muscle loss, bone loss, hair loss, and hormone problems. BMI is just one measure of your risk for weight-related health problems. You may be at higher risk for health problems if you are not active, you eat an unhealthy diet, or you drink too much alcohol or use tobacco products. Follow-up care is a key part of your treatment and safety. Be sure to make and go to all appointments, and call your doctor if you are having problems. It's also a good idea to know your test results and keep a list of the medicines you take. How can you care for yourself at home? · Practice healthy eating habits. This includes eating plenty of fruits, vegetables, whole grains, lean protein, and low-fat dairy. · If your doctor recommends it, get more exercise. Walking is a good choice. Bit by bit, increase the amount you walk every day. Try for at least 30 minutes on most days of the week. · Do not smoke. Smoking can increase your risk for health problems. If you need help quitting, talk to your doctor about stop-smoking programs and medicines. These can increase your chances of quitting for good. · Limit alcohol to 2 drinks a day for men and 1 drink a day for women. Too much alcohol can cause health problems. If you have a BMI higher than 25  · Your doctor may do other tests to check your risk for weight-related health problems. This may include measuring the distance around your waist. A waist measurement of more than 40 inches in men or 35 inches in women can increase the risk of weight-related health problems. · Talk with your doctor about steps you can take to stay healthy or improve your health. You may need to make lifestyle changes to lose weight and stay healthy, such as changing your diet and getting regular exercise. If you have a BMI lower than 18.5  · Your doctor may do other tests to check your risk for health problems. · Talk with your doctor about steps you can take to stay healthy or improve your health. You may need to make lifestyle changes to gain or maintain weight and stay healthy, such as getting more healthy foods in your diet and doing exercises to build muscle. Where can you learn more? Go to http://faisal-mason.info/. Enter S176 in the search box to learn more about \"Body Mass Index: Care Instructions. \"  Current as of: October 13, 2016  Content Version: 11.4  © 8854-5963 [employer]. Care instructions adapted under license by [employer] (which disclaims liability or warranty for this information). If you have questions about a medical condition or this instruction, always ask your healthcare professional. [address] any warranty or liability for your use of this information.

## 2017-12-20 NOTE — PROGRESS NOTES
Subjective:     Rafael Long  is a 47 y.o. female who presents for follow-up about 18 months following laparoscopic sleeve gastrectomy. She has   lost a total of 58 pounds since surgery. Body mass index is 41.61 kg/(m^2). . EBWL is (39%). The patient presents today to assess   their progress toward their goal of weight loss and to address any issues that may be present. Today the patient and I have reviewed   their diet and how appropriate their food choices are. The following issues have been identified : gained another 4-5 pounds since last visit. GERD  Had hip replacement in July. Has had GERD as of late. Has had some abdominal pain also. CT scheduled for am.  Ultrasound was negative. No urine culture done. .  Surgery related complication: none       She reports weight gain and denies vomiting and abdominal pain. The patients diet choices have been reviewed today and are as follows:         Patients pain score:0      The patient's exercise level: not active. Changes in her medical history and medications have been reviewed.     Patient Active Problem List   Diagnosis Code    Hypertension I10    Hypercholesteremia E78.00    Arthritis of knee, left M17.12    Chronic back pain M54.9, G89.29    Arthritis of right hip M16.11    GERD (gastroesophageal reflux disease) K21.9    Morbid obesity (HCC) E66.01    S/P bariatric surgery Z98.84    Intestinal malabsorption K90.9    Chronic gastritis K29.50    H. pylori infection A04.8     Past Medical History:   Diagnosis Date    Arthritis of knee, left     Arthritis of right hip     Chronic back pain     Diabetes mellitus (HCC)     Fibromyalgia     GERD (gastroesophageal reflux disease)     Hypercholesteremia     Hypertension     Intestinal malabsorption     Morbid obesity ([address]     Morbid obesity with body mass index of 50.0-59.9 in adult [facility])     Status post bariatric surgery 5/2016    sleeve resection / terracina     Past Surgical History:   Procedure Laterality Date    HX GI  2016    sleeve resection / terracina    HX GYN      RICCI    HX GYN      c section x 3    HX HERNIA REPAIR      In  incision with mesh    LAPAROSCOPY ABDOMEN DIAGNOSTIC      aborted gastric bypass / terracina     Current Outpatient Prescriptions   Medication Sig Dispense Refill    dicyclomine (BENTYL) 20 mg tablet Take 20 mg by mouth every eight (8) hours.  tiZANidine (ZANAFLEX) 4 mg tablet Take 4 mg by mouth daily.  TRAMADOL HCL (TRAMADOL PO) Take 50 mg by mouth.  cholecalciferol (VITAMIN D3) 1,000 unit cap Take  by mouth daily.  b complex vitamins (B-50 COMPLEX) tablet Take 1 Tab by mouth daily.  Biotin 2,500 mcg cap Take  by mouth.  losartan-hydrochlorothiazide (HYZAAR) 100-25 mg per tablet Take 1 Tab by mouth daily.  cyanocobalamin (VITAMIN B-12) 1,000 mcg sublingual tablet Take 1,000 mcg by mouth daily.  calcium citrate-vitamin d3 (CITRACAL+D) 315-200 mg-unit tab Take 1 Tab by mouth daily (with breakfast).  gabapentin (NEURONTIN) 300 mg capsule Take 600 mg by mouth three (3) times daily.  multivitamin with iron (FLINTSTONES) chewable tablet Take 2 Tabs by mouth daily.  Indications: VITAMIN DEFICIENCY PREVENTION          Review of Symptoms:       General - No history or complaints of unexpected fever or chills  Head/Neck - No history or complaints of headache or dizziness  Cardiac - No history or complaints of chest pain, palpitations, or shortness of breath  Pulmonary - No history or complaints of shortness of breath or productive cough  Gastrointestinal - as noted above  Genitourinary - No history or complaints of hematuria/dysuria or renal lithiasis  Musculoskeletal - No history or complaints of joint  muscular weakness  Hematologic - No history of any bleeding episodes  Neurologic - No history or complaints of  migraine headaches or neurologic symptoms                     Objective:     Visit Vitals  /76 (BP 1 Location: Left arm, BP Patient Position: Sitting)    Pulse 62    Resp 16    Ht 4' 11\" (1.499 m)    Wt 93.4 kg (206 lb)    SpO2 100%    BMI 41.61 kg/m2        Physical Exam:    General:  alert, cooperative, no distress, appears stated age   Lungs:   clear to auscultation bilaterally   Heart:  Regular rate and rhythm   Abdomen:   abdomen is soft without significant tenderness, masses, organomegaly or guarding; Incisions: healing well, no hernias2         Lab Results   Component Value Date/Time    WBC 3.7 06/06/2017 09:08 AM    HGB 12.7 06/06/2017 09:08 AM    HCT 38.5 06/06/2017 09:08 AM    PLATELET 580 99/50/5240 09:08 AM    MCV 85.7 06/06/2017 09:08 AM     Lab Results   Component Value Date/Time    Sodium 144 06/06/2017 09:08 AM    Potassium 4.7 06/06/2017 09:08 AM    Chloride 104 06/06/2017 09:08 AM    CO2 35 06/06/2017 09:08 AM    Anion gap 5 06/06/2017 09:08 AM    Glucose 89 06/06/2017 09:08 AM    BUN 10 06/06/2017 09:08 AM    Creatinine 0.70 06/06/2017 09:08 AM    BUN/Creatinine ratio 14 06/06/2017 09:08 AM    GFR est AA >60 06/06/2017 09:08 AM    GFR est non-AA >60 06/06/2017 09:08 AM    Calcium 9.8 06/06/2017 09:08 AM    Bilirubin, total 0.6 06/06/2017 09:08 AM    AST (SGOT) 24 06/06/2017 09:08 AM    Alk. phosphatase 109 06/06/2017 09:08 AM    Protein, total 7.4 06/06/2017 09:08 AM    Albumin 3.7 06/06/2017 09:08 AM    Globulin 3.7 06/06/2017 09:08 AM    A-G Ratio 1.0 06/06/2017 09:08 AM    ALT (SGPT) 21 06/06/2017 09:08 AM     Lab Results   Component Value Date/Time    Iron 95 06/06/2017 09:08 AM    Ferritin 62 06/06/2017 09:08 AM     Lab Results   Component Value Date/Time    Folate >20.0 06/06/2017 09:08 AM     Lab Results   Component Value Date/Time    Vitamin D 25-Hydroxy 43.6 11/11/2016 08:16 AM             Assessment:     1. History of Morbid obesity, status post  laparoscopic sleeve gastrectomy. Weight gain continues and poor weight loss. Plan:     1.  Remember to measure portions, continue low carbohydrate diet  2. Continue to concentrate on protein intake meeting daily requirements  3. Remember vitamin supplements. The importance of such was discussed regarding the malabsorptive issues that the surgery creates. 4. Exercise regimen appears to be: impr  5. Try and attend support group if feasible. 6. Follow-up in 6 month(s). oving with new hip  7. UA and culture ordered  8. Total time spent with the patient 30 minutes. 9. Check CT, may need CCK HIDA.

## 2017-12-20 NOTE — MR AVS SNAPSHOT
Visit Information Date & Time Provider Department Dept. Phone Encounter #  
 12/20/2017  8:30 AM David Rodriguez MD [facility] [phone] [id number] Your Appointments 6/22/2018  9:00 AM  
Office Visit with David Rodriguez MD  
[facility]) Appt Note: f/u  
 [address] [facility]  
  
   
 [address] [place] [address] Upcoming Health Maintenance Date Due DTaP/Tdap/Td series (1 - Tdap) 10/22/1987 PAP AKA CERVICAL CYTOLOGY 10/22/1987 FOBT Q 1 YEAR AGE 50-75 10/22/2016 Influenza Age 5 to Adult 8/1/2017 Allergies as of 12/20/2017  Review Complete On: 12/20/2017 By: David Rodriguez MD  
  
 Severity Noted Reaction Type Reactions Aspirin  04/05/2016    Rash Vicodin [Hydrocodone-acetaminophen]  09/23/2015    Hives Current Immunizations  Reviewed on 5/19/2016 Name Date Pneumococcal Vaccine (Unspecified Type) 10/1/2015 Not reviewed this visit You Were Diagnosed With   
  
 Codes Comments Lower abdominal pain    -  Primary ICD-10-CM: R10.30 ICD-9-CM: 789.09 Intestinal malabsorption, unspecified type     ICD-10-CM: K90.9 ICD-9-CM: 579.9 S/P bariatric surgery     ICD-10-CM: Z98.84 ICD-9-CM: V45.86 Vitals BP Pulse Resp Height(growth percentile) Weight(growth percentile) SpO2  
 145/76 (BP 1 Location: Left arm, BP Patient Position: Sitting) 62 16 4' 11\" (1.499 m) 206 lb (93.4 kg) 100% BMI OB Status Smoking Status 41.61 kg/m2 Hysterectomy Former Smoker Vitals History BMI and BSA Data Body Mass Index Body Surface Area  
 41.61 kg/m 2 1.97 m 2 Preferred Pharmacy Pharmacy Name Phone [id number] [place], [address] [facility] [address] [phone] Your Updated Medication List  
  
   
 This list is accurate as of: 12/20/17  8:58 AM.  Always use your most recent med list.  
  
  
  
  
 B-50 COMPLEX tablet Generic drug:  b complex vitamins Take 1 Tab by mouth daily. Biotin 2,500 mcg Cap Take  by mouth.  
  
 calcium citrate-vitamin d3 315-200 mg-unit Tab Commonly known as:  CITRACAL+D Take 1 Tab by mouth daily (with breakfast). cyanocobalamin 1,000 mcg sublingual tablet Commonly known as:  VITAMIN B-12 Take 1,000 mcg by mouth daily. dicyclomine 20 mg tablet Commonly known as:  BENTYL Take 20 mg by mouth every eight (8) hours. gabapentin 300 mg capsule Commonly known as:  NEURONTIN Take 600 mg by mouth three (3) times daily. losartan-hydroCHLOROthiazide 100-25 mg per tablet Commonly known as:  HYZAAR Take 1 Tab by mouth daily. multivitamin with iron chewable tablet Commonly known as:  [place] Take 2 Tabs by mouth daily. Indications: VITAMIN DEFICIENCY PREVENTION PriLOSEC 20 mg capsule Generic drug:  omeprazole Take 20 mg by mouth daily. tiZANidine 4 mg tablet Commonly known as:  [place] Take 4 mg by mouth daily. TRAMADOL PO Take 50 mg by mouth. VITAMIN D3 1,000 unit Cap Generic drug:  cholecalciferol Take  by mouth daily. To-Do List   
 12/20/2017 Microbiology:  CULTURE, URINE Patient Instructions Body Mass Index: Care Instructions Your Care Instructions Body mass index (BMI) can help you see if your weight is raising your risk for health problems. It uses a formula to compare how much you weigh with how tall you are. · A BMI lower than 18.5 is considered underweight. · A BMI between 18.5 and 24.9 is considered healthy. · A BMI between 25 and 29.9 is considered overweight. A BMI of 30 or higher is considered obese.  
If your BMI is in the normal range, it means that you have a lower risk for weight-related health problems. If your BMI is in the overweight or obese range, you may be at increased risk for weight-related health problems, such as high blood pressure, heart disease, stroke, arthritis or joint pain, and diabetes. If your BMI is in the underweight range, you may be at increased risk for health problems such as fatigue, lower protection (immunity) against illness, muscle loss, bone loss, hair loss, and hormone problems. BMI is just one measure of your risk for weight-related health problems. You may be at higher risk for health problems if you are not active, you eat an unhealthy diet, or you drink too much alcohol or use tobacco products. Follow-up care is a key part of your treatment and safety. Be sure to make and go to all appointments, and call your doctor if you are having problems. It's also a good idea to know your test results and keep a list of the medicines you take. How can you care for yourself at home? · Practice healthy eating habits. This includes eating plenty of fruits, vegetables, whole grains, lean protein, and low-fat dairy. · If your doctor recommends it, get more exercise. Walking is a good choice. Bit by bit, increase the amount you walk every day. Try for at least 30 minutes on most days of the week. · Do not smoke. Smoking can increase your risk for health problems. If you need help quitting, talk to your doctor about stop-smoking programs and medicines. These can increase your chances of quitting for good. · Limit alcohol to 2 drinks a day for men and 1 drink a day for women. Too much alcohol can cause health problems. If you have a BMI higher than 25 · Your doctor may do other tests to check your risk for weight-related health problems. This may include measuring the distance around your waist. A waist measurement of more than 40 inches in men or 35 inches in women can increase the risk of weight-related health problems. · Talk with your doctor about steps you can take to stay healthy or improve your health. You may need to make lifestyle changes to lose weight and stay healthy, such as changing your diet and getting regular exercise. If you have a BMI lower than 18.5 · Your doctor may do other tests to check your risk for health problems. · Talk with your doctor about steps you can take to stay healthy or improve your health. You may need to make lifestyle changes to gain or maintain weight and stay healthy, such as getting more healthy foods in your diet and doing exercises to build muscle. Where can you learn more? Go to http://faisal-mason.info/. Enter S176 in the search box to learn more about \"Body Mass Index: Care Instructions. \" Current as of: October 13, 2016 Content Version: 11.4 © 9048-7907 Nimsoft. Care instructions adapted under license by Food[employer] (which disclaims liability or warranty for this information). If you have questions about a medical condition or this instruction, always ask your healthcare professional. [place] [address] any warranty or liability for your use of this information. Patient Instructions History Introducing hospitals & HEALTH SERVICES! Dear Adele Fontaine: Thank you for requesting a [employer] account. Our records indicate that you already have an active [employer] account. You can access your account anytime at [email or web address] Did you know that you can access your hospital and ER discharge instructions at any time in [employer]? You can also review all of your test results from your hospital stay or ER visit. Additional Information If you have questions, please visit the Frequently Asked Questions section of the [employer] website at [email or web address] Remember, [employer] is NOT to be used for urgent needs. For medical emergencies, dial 911. Now available from your iPhone and Android! Please provide this summary of care documentation to your next provider. Your primary care clinician is listed as Shannon Friends. If you have any questions after today's visit, please call [phone].

## 2017-12-21 LAB
BACTERIA SPEC CULT: NORMAL
SERVICE CMNT-IMP: NORMAL

## 2018-06-22 ENCOUNTER — OFFICE VISIT (OUTPATIENT)
Dept: SURGERY | Age: 53
End: 2018-06-22

## 2018-06-22 VITALS
WEIGHT: 201.5 LBS | OXYGEN SATURATION: 100 % | SYSTOLIC BLOOD PRESSURE: 126 MMHG | HEART RATE: 77 BPM | BODY MASS INDEX: 40.62 KG/M2 | RESPIRATION RATE: 16 BRPM | DIASTOLIC BLOOD PRESSURE: 73 MMHG | HEIGHT: 59 IN

## 2018-06-22 DIAGNOSIS — K90.9 INTESTINAL MALABSORPTION, UNSPECIFIED TYPE: Primary | ICD-10-CM

## 2018-06-22 DIAGNOSIS — Z98.84 S/P BARIATRIC SURGERY: ICD-10-CM

## 2018-06-22 RX ORDER — SIMVASTATIN 20 MG/1
20 TABLET, FILM COATED ORAL
COMMUNITY

## 2018-06-22 NOTE — PROGRESS NOTES
2 years follow-up    Subjective:     Ashia Coburn  is a 47 y.o. female who presents for follow-up about 2 years following laparoscopic sleeve gastrectomy. She has lost a total of 63 pounds since surgery. Body mass index is 40.7 kg/(m^2). . EBWL is (42%). The patient presents today to assess their progress toward their goal of weight loss and to address any issues that may be present. Today the patient and I have reviewed their diet and how appropriate their food choices are. The following issues have been identified  - pt with 5 lb wt loss over the past 6 months. She had an exploratory laparotomy, removal of old mesh with INCISIONAL HERNIA REPAIR and Lysis of Adhesions done by Dr. Frederic Singleton in Feb at [address]      Pain assessment - 0/10  . Surgery related complication: NA       She reports no issues from a surgical standpoint and denies vomiting, diarrhea and difficulty breathing. The patient's exercise level: not active. Changes in her medical history and medications have been reviewed.     Patient Active Problem List   Diagnosis Code    Hypertension I10    Hypercholesteremia E78.00    Arthritis of knee, left M17.12    Chronic back pain M54.9, G89.29    Arthritis of right hip M16.11    GERD (gastroesophageal reflux disease) K21.9    Morbid obesity (HCC) E66.01    S/P bariatric surgery Z98.84    Intestinal malabsorption K90.9    Chronic gastritis K29.50    H. pylori infection A04.8     Past Medical History:   Diagnosis Date    Arthritis of knee, left     Arthritis of right hip     Chronic back pain     Diabetes mellitus (HCC)     Fibromyalgia     GERD (gastroesophageal reflux disease)     Hypercholesteremia     Hypertension     Intestinal malabsorption     Morbid obesity ([address]     Morbid obesity with body mass index of 50.0-59.9 in adult [facility])     Status post bariatric surgery 5/2016    sleeve resection / terracina     Past Surgical History:   Procedure Laterality Date    HX GI  2016    sleeve resection / terracina    HX GYN      RICCI    HX GYN      c section x 3    HX HERNIA REPAIR      In  incision with mesh    HX ORTHOPAEDIC      Rt hip replacement- 2017    LAPAROSCOPY ABDOMEN DIAGNOSTIC      aborted gastric bypass / terracina     Current Outpatient Prescriptions   Medication Sig Dispense Refill    levetiracetam (KEPPRA PO) Take  by mouth two (2) times a day.  simvastatin (ZOCOR) 20 mg tablet Take  by mouth nightly.  dicyclomine (BENTYL) 20 mg tablet Take 20 mg by mouth every eight (8) hours.  omeprazole (PRILOSEC) 20 mg capsule Take 20 mg by mouth daily.  cholecalciferol (VITAMIN D3) 1,000 unit cap Take  by mouth daily.  b complex vitamins (B-50 COMPLEX) tablet Take 1 Tab by mouth daily.  Biotin 2,500 mcg cap Take  by mouth.  losartan-hydrochlorothiazide (HYZAAR) 100-25 mg per tablet Take 1 Tab by mouth daily.  cyanocobalamin (VITAMIN B-12) 1,000 mcg sublingual tablet Take 1,000 mcg by mouth daily.  calcium citrate-vitamin d3 (CITRACAL+D) 315-200 mg-unit tab Take 1 Tab by mouth daily (with breakfast).  gabapentin (NEURONTIN) 300 mg capsule Take 600 mg by mouth two (2) times a day.  multivitamin with iron (FLINTSTONES) chewable tablet Take 2 Tabs by mouth daily.  Indications: VITAMIN DEFICIENCY PREVENTION         Review of Symptoms:     General - No history or complaints of unexpected fever or chills  Head/Neck - No history or complaints of headache or dizziness  Cardiac - No history or complaints of chest pain, palpitations, or shortness of breath  Pulmonary - No history or complaints of shortness of breath or productive cough  Gastrointestinal - as noted above  Genitourinary - No history or complaints of hematuria/dysuria or renal lithiasis  Musculoskeletal - No history or complaints of joint  muscular weakness  Hematologic - No history of any bleeding episodes  Neurologic - No history or complaints of migraine headaches or neurologic symptoms    Objective:     Visit Vitals    /73 (BP 1 Location: Left arm, BP Patient Position: Sitting)    Pulse 77    Resp 16    Ht 4' 11\" (1.499 m)    Wt 91.4 kg (201 lb 8 oz)    SpO2 100%    BMI 40.7 kg/m2        Physical Exam:    General:  alert, cooperative, no distress, appears stated age   Lungs:   clear to auscultation bilaterally   Heart:  Regular rate and rhythm   Abdomen:   abdomen is soft without significant tenderness, masses, organomegaly or guarding; Incisions: healing well, no significant drainage         Labs:     No results found for this or any previous visit (from the past 2016 hour(s)). Assessment:     1. History of Morbid obesity, status post  laparoscopic sleeve gastrectomy. The patient realizes she is behind with her weight loss and \"blames\" her 42% weight loss on a variety of factors. Most notably her limited mobility. I have discussed upper body chair exercises and the fact that her diet has to be essentially perfect in order to lose her desired 25 more lbs. Dietary handouts given. I have suggested she meet with dietary staff. I have spent a great deal of time reviewing an extensive work-up she underwent earlier this in conjunction with her laparotomy and TRACIE and subsequent complications with seromas    She has an upcoming knee surgery and abdominoplasty      Plan:     1. Remember to measure portions, continue low carbohydrate diet  2. Reviewed labs and appropriate changes made to vitamin regimen  3. Remember vitamin supplements - The importance of such was discussed regarding the malabsorptive issues that the surgery creates  4. Exercise regimen appears inadequate. 5. Attend support group  6. Follow-up in 1 year(s). 7. The patient understands the plan of action  8. Total time spent with the patient 30 minutes. I have reviewed the patients history and clinical findings with my physician extender in person.  The patient has had all of their questions answered today including both exercise and dietary issues. I have reviwed any relevant  data and agree with the current plan of action.           Beverley Crane M.D.

## 2018-06-22 NOTE — MR AVS SNAPSHOT
[address] [facility] 
 
 
 One [facility] [place] [address] [facility] 
[phone] Patient: Catie Miller MRN: [id number] Visit Information Date & Time Provider Department Dept. Phone Encounter #  
 2018  9:00 AM [facility] Lyle [id number] Your Appointments 2019  8:30 AM  
Office Visit with MD Becky Vang [facility]: year [facility] [place] [address] [facility] [place] [address]  
  
   
 [address] [facility] Upcoming Health Maintenance Date Due DTaP/Tdap/Td series (1 - Tdap) 10/22/1987 PAP AKA CERVICAL CYTOLOGY 10/22/1987 BREAST CANCER SCRN MAMMOGRAM 10/22/2016 FOBT Q 1 YEAR AGE 50-75 10/22/2016 MEDICARE YEARLY EXAM 3/14/2018 Influenza Age 5 to Adult 2018 Allergies as of 2018  Review Complete On: 2018 By: Emir Olmedo LPN Severity Noted Reaction Type Reactions Aspirin  2016    Rash Vicodin [Hydrocodone-acetaminophen]  2015    Hives Current Immunizations  Reviewed on 2016 Name Date Pneumococcal Vaccine (Unspecified Type) 10/1/2015 Not reviewed this visit Vitals BP Pulse Height(growth percentile) Weight(growth percentile) SpO2 BMI  
 126/73 (BP 1 Location: Left arm, BP Patient Position: Sitting) 77 4' 11\" (1.499 m) 201 lb 8 oz (91.4 kg) 100% 40.7 kg/m2 OB Status Smoking Status Hysterectomy Former Smoker Vitals History BMI and BSA Data Body Mass Index Body Surface Area 40.7 kg/m 2 1.95 m 2 Preferred Pharmacy Pharmacy Name Phone [id number] [place], [address] [facility] [address] [phone] Your Updated Medication List  
  
   
 This list is accurate as of 6/22/18 10:19 AM.  Always use your most recent med list.  
  
  
  
  
 B-50 COMPLEX tablet Generic drug:  b complex vitamins Take 1 Tab by mouth daily. Biotin 2,500 mcg Cap Take  by mouth.  
  
 calcium citrate-vitamin d3 315-200 mg-unit Tab Commonly known as:  CITRACAL+D Take 1 Tab by mouth daily (with breakfast). cyanocobalamin 1,000 mcg sublingual tablet Commonly known as:  VITAMIN B-12 Take 1,000 mcg by mouth daily. dicyclomine 20 mg tablet Commonly known as:  BENTYL Take 20 mg by mouth every eight (8) hours. gabapentin 300 mg capsule Commonly known as:  NEURONTIN Take 600 mg by mouth two (2) times a day. KEPPRA PO Take  by mouth two (2) times a day. losartan-hydroCHLOROthiazide 100-25 mg per tablet Commonly known as:  HYZAAR Take 1 Tab by mouth daily. multivitamin with iron chewable tablet Commonly known as:  [place] Comp Take 2 Tabs by mouth daily. Indications: VITAMIN DEFICIENCY PREVENTION PriLOSEC 20 mg capsule Generic drug:  omeprazole Take 20 mg by mouth daily. VITAMIN D3 1,000 unit Cap Generic drug:  cholecalciferol Take  by mouth daily. ZOCOR 20 mg tablet Generic drug:  simvastatin Take  by mouth nightly. Patient Instructions Body Mass Index: Care Instructions Your Care Instructions Body mass index (BMI) can help you see if your weight is raising your risk for health problems. It uses a formula to compare how much you weigh with how tall you are. · A BMI lower than 18.5 is considered underweight. · A BMI between 18.5 and 24.9 is considered healthy. · A BMI between 25 and 29.9 is considered overweight. A BMI of 30 or higher is considered obese. If your BMI is in the normal range, it means that you have a lower risk for weight-related health problems.  If your BMI is in the overweight or obese range, you may be at increased risk for weight-related health problems, such as high blood pressure, heart disease, stroke, arthritis or joint pain, and diabetes. If your BMI is in the underweight range, you may be at increased risk for health problems such as fatigue, lower protection (immunity) against illness, muscle loss, bone loss, hair loss, and hormone problems. BMI is just one measure of your risk for weight-related health problems. You may be at higher risk for health problems if you are not active, you eat an unhealthy diet, or you drink too much alcohol or use tobacco products. Follow-up care is a key part of your treatment and safety. Be sure to make and go to all appointments, and call your doctor if you are having problems. It's also a good idea to know your test results and keep a list of the medicines you take. How can you care for yourself at home? · Practice healthy eating habits. This includes eating plenty of fruits, vegetables, whole grains, lean protein, and low-fat dairy. · If your doctor recommends it, get more exercise. Walking is a good choice. Bit by bit, increase the amount you walk every day. Try for at least 30 minutes on most days of the week. · Do not smoke. Smoking can increase your risk for health problems. If you need help quitting, talk to your doctor about stop-smoking programs and medicines. These can increase your chances of quitting for good. · Limit alcohol to 2 drinks a day for men and 1 drink a day for women. Too much alcohol can cause health problems. If you have a BMI higher than 25 · Your doctor may do other tests to check your risk for weight-related health problems. This may include measuring the distance around your waist. A waist measurement of more than 40 inches in men or 35 inches in women can increase the risk of weight-related health problems.  
· Talk with your doctor about steps you can take to stay healthy or improve your health. You may need to make lifestyle changes to lose weight and stay healthy, such as changing your diet and getting regular exercise. If you have a BMI lower than 18.5 · Your doctor may do other tests to check your risk for health problems. · Talk with your doctor about steps you can take to stay healthy or improve your health. You may need to make lifestyle changes to gain or maintain weight and stay healthy, such as getting more healthy foods in your diet and doing exercises to build muscle. Where can you learn more? Go to http://faisal-mason.info/. Enter S176 in the search box to learn more about \"Body Mass Index: Care Instructions. \" Current as of: October 13, 2016 Content Version: 11.4 © 8797-2712 Fonmatch. Care instructions adapted under license by joiz (which disclaims liability or warranty for this information). If you have questions about a medical condition or this instruction, always ask your healthcare professional. [place] [address] any warranty or liability for your use of this information. Introducing [facility] & HEALTH SERVICES! Dear Valencia Mao: Thank you for requesting a Aptible account. Our records indicate that you already have an active Aptible account. You can access your account anytime at [email or web address] Did you know that you can access your hospital and ER discharge instructions at any time in Aptible? You can also review all of your test results from your hospital stay or ER visit. Additional Information If you have questions, please visit the Frequently Asked Questions section of the Aptible website at [email or web address] Remember, Aptible is NOT to be used for urgent needs. For medical emergencies, dial 911. Now available from your iPhone and Android! Please provide this summary of care documentation to your next provider. Your primary care clinician is listed as Andrew Trejo. If you have any questions after today's visit, please call [phone].

## 2018-06-22 NOTE — PATIENT INSTRUCTIONS
Body Mass Index: Care Instructions  Your Care Instructions    Body mass index (BMI) can help you see if your weight is raising your risk for health problems. It uses a formula to compare how much you weigh with how tall you are. · A BMI lower than 18.5 is considered underweight. · A BMI between 18.5 and 24.9 is considered healthy. · A BMI between 25 and 29.9 is considered overweight. A BMI of 30 or higher is considered obese. If your BMI is in the normal range, it means that you have a lower risk for weight-related health problems. If your BMI is in the overweight or obese range, you may be at increased risk for weight-related health problems, such as high blood pressure, heart disease, stroke, arthritis or joint pain, and diabetes. If your BMI is in the underweight range, you may be at increased risk for health problems such as fatigue, lower protection (immunity) against illness, muscle loss, bone loss, hair loss, and hormone problems. BMI is just one measure of your risk for weight-related health problems. You may be at higher risk for health problems if you are not active, you eat an unhealthy diet, or you drink too much alcohol or use tobacco products. Follow-up care is a key part of your treatment and safety. Be sure to make and go to all appointments, and call your doctor if you are having problems. It's also a good idea to know your test results and keep a list of the medicines you take. How can you care for yourself at home? · Practice healthy eating habits. This includes eating plenty of fruits, vegetables, whole grains, lean protein, and low-fat dairy. · If your doctor recommends it, get more exercise. Walking is a good choice. Bit by bit, increase the amount you walk every day. Try for at least 30 minutes on most days of the week. · Do not smoke. Smoking can increase your risk for health problems. If you need help quitting, talk to your doctor about stop-smoking programs and medicines. These can increase your chances of quitting for good. · Limit alcohol to 2 drinks a day for men and 1 drink a day for women. Too much alcohol can cause health problems. If you have a BMI higher than 25  · Your doctor may do other tests to check your risk for weight-related health problems. This may include measuring the distance around your waist. A waist measurement of more than 40 inches in men or 35 inches in women can increase the risk of weight-related health problems. · Talk with your doctor about steps you can take to stay healthy or improve your health. You may need to make lifestyle changes to lose weight and stay healthy, such as changing your diet and getting regular exercise. If you have a BMI lower than 18.5  · Your doctor may do other tests to check your risk for health problems. · Talk with your doctor about steps you can take to stay healthy or improve your health. You may need to make lifestyle changes to gain or maintain weight and stay healthy, such as getting more healthy foods in your diet and doing exercises to build muscle. Where can you learn more? Go to http://faisal-mason.info/. Enter S176 in the search box to learn more about \"Body Mass Index: Care Instructions. \"  Current as of: October 13, 2016  Content Version: 11.4  © 2919-4476 [employer]wise, [employer]. Care instructions adapted under license by [employer] (which disclaims liability or warranty for this information). If you have questions about a medical condition or this instruction, always ask your healthcare professional. [address] any warranty or liability for your use of this information.

## 2020-04-03 ENCOUNTER — DOCUMENTATION ONLY (OUTPATIENT)
Dept: SURGERY | Age: 55
End: 2020-04-03

## 2020-04-03 NOTE — LETTER
[address] [place] [address] [address] [facility] 
 
 
Dear Patient, Your health is our main concern. It is important for your health to have follow-up lab work and to see you surgeon at 2 months, 4 months, 6 months, 9 months and annually after your weight loss surgery. Additionally, the Department of Bariatric Surgery at our hospital is a member of the [employer] [address] Surgical Quality Improvement Program ([facility] NSQIP). As a participant in this program, we gather information on the outcomes of our patients after surgery. Please call the office for a follow up appointment at [phone]. If you have moved out of the area or have changed surgeons please call us and let us know the name of your doctor. Your health and feedback are important to us. We greatly appreciate your response. Thank you, [address] [place] [address] Loss [address]

## 2020-04-03 NOTE — PROGRESS NOTES
Per [facility] requirements;  E-mail and letter sent for follow up appointment. [facility] Loss [place]  [facility]  [facility]      Dear Patient,    Your health is our main concern. It is important for your health to have follow-up lab work and to see you surgeon at 2 months, 4 months, 6 months, 9 months and annually after your weight loss surgery. Additionally, the Department of Bariatric Surgery at our hospital is a member of the [employer] [address] Surgical Quality Improvement Program ([facility] NSQIP). As a participant in this program, we gather information on the outcomes of our patients after surgery. Please call the office for a follow up appointment at [phone]. If you have moved out of the area or have changed surgeons please call us and let us know the name of your doctor. Your health and feedback are important to us. We greatly appreciate your response.        Thank you,  [facility] Loss [address] [facility]

## 2022-07-01 ENCOUNTER — HOSPITAL ENCOUNTER (OUTPATIENT)
Dept: PREADMISSION TESTING | Age: 57
Discharge: HOME | End: 2022-07-01
Payer: MEDICARE

## 2022-07-01 ENCOUNTER — TRANSCRIBE ORDER (OUTPATIENT)
Dept: REGISTRATION | Age: 57
End: 2022-07-01

## 2022-07-01 DIAGNOSIS — M17.12 DEGENERATIVE ARTHRITIS OF LEFT KNEE: Primary | ICD-10-CM

## 2022-07-01 DIAGNOSIS — M17.12 DEGENERATIVE ARTHRITIS OF LEFT KNEE: ICD-10-CM

## 2022-07-01 LAB
ABO + RH BLD: NORMAL
ALBUMIN SERPL-MCNC: 3.7 G/DL (ref 3.4–5)
ALBUMIN/GLOB SERPL: 1.2 {RATIO} (ref 0.8–1.7)
ALP SERPL-CCNC: 103 U/L (ref 45–117)
ALT SERPL-CCNC: 17 U/L (ref 13–56)
ANION GAP SERPL CALC-SCNC: 2 MMOL/L (ref 3–18)
APPEARANCE UR: CLEAR
APTT PPP: 27.1 SEC (ref 23–36.4)
AST SERPL-CCNC: 18 U/L (ref 10–38)
ATRIAL RATE: 63 BPM
BACTERIA URNS QL MICRO: NEGATIVE /HPF
BASOPHILS # BLD: 0.1 K/UL (ref 0–0.1)
BASOPHILS NFR BLD: 2 % (ref 0–2)
BILIRUB SERPL-MCNC: 0.4 MG/DL (ref 0.2–1)
BILIRUB UR QL: NEGATIVE
BLOOD GROUP ANTIBODIES SERPL: NORMAL
BUN SERPL-MCNC: 16 MG/DL (ref 7–18)
BUN/CREAT SERPL: 23 (ref 12–20)
CALCIUM SERPL-MCNC: 9.4 MG/DL (ref 8.5–10.1)
CALCULATED P AXIS, ECG09: 53 DEGREES
CALCULATED R AXIS, ECG10: 73 DEGREES
CALCULATED T AXIS, ECG11: 66 DEGREES
CHLORIDE SERPL-SCNC: 107 MMOL/L (ref 100–111)
CO2 SERPL-SCNC: 34 MMOL/L (ref 21–32)
COLOR UR: YELLOW
CREAT SERPL-MCNC: 0.7 MG/DL (ref 0.6–1.3)
DIAGNOSIS, 93000: NORMAL
DIFFERENTIAL METHOD BLD: ABNORMAL
EOSINOPHIL # BLD: 0.1 K/UL (ref 0–0.4)
EOSINOPHIL NFR BLD: 3 % (ref 0–5)
EPITH CASTS URNS QL MICRO: NORMAL /LPF (ref 0–5)
ERYTHROCYTE [DISTWIDTH] IN BLOOD BY AUTOMATED COUNT: 13.6 % (ref 11.6–14.5)
GLOBULIN SER CALC-MCNC: 3 G/DL (ref 2–4)
GLUCOSE SERPL-MCNC: 97 MG/DL (ref 74–99)
GLUCOSE UR STRIP.AUTO-MCNC: NEGATIVE MG/DL
HCT VFR BLD AUTO: 38.4 % (ref 35–45)
HGB BLD-MCNC: 11.8 G/DL (ref 12–16)
HGB UR QL STRIP: NEGATIVE
IMM GRANULOCYTES # BLD AUTO: 0 K/UL (ref 0–0.04)
IMM GRANULOCYTES NFR BLD AUTO: 0 % (ref 0–0.5)
INR PPP: 1 (ref 0.8–1.2)
KETONES UR QL STRIP.AUTO: NEGATIVE MG/DL
LEUKOCYTE ESTERASE UR QL STRIP.AUTO: ABNORMAL
LYMPHOCYTES # BLD: 1.5 K/UL (ref 0.9–3.6)
LYMPHOCYTES NFR BLD: 41 % (ref 21–52)
MCH RBC QN AUTO: 27.7 PG (ref 24–34)
MCHC RBC AUTO-ENTMCNC: 30.7 G/DL (ref 31–37)
MCV RBC AUTO: 90.1 FL (ref 78–100)
MONOCYTES # BLD: 0.5 K/UL (ref 0.05–1.2)
MONOCYTES NFR BLD: 12 % (ref 3–10)
NEUTS SEG # BLD: 1.5 K/UL (ref 1.8–8)
NEUTS SEG NFR BLD: 41 % (ref 40–73)
NITRITE UR QL STRIP.AUTO: NEGATIVE
NRBC # BLD: 0 K/UL (ref 0–0.01)
NRBC BLD-RTO: 0 PER 100 WBC
P-R INTERVAL, ECG05: 142 MS
PH UR STRIP: 6 [PH] (ref 5–8)
PLATELET # BLD AUTO: 183 K/UL (ref 135–420)
PMV BLD AUTO: 11.6 FL (ref 9.2–11.8)
POTASSIUM SERPL-SCNC: 4.2 MMOL/L (ref 3.5–5.5)
PROT SERPL-MCNC: 6.7 G/DL (ref 6.4–8.2)
PROT UR STRIP-MCNC: NEGATIVE MG/DL
PROTHROMBIN TIME: 13.2 SEC (ref 11.5–15.2)
Q-T INTERVAL, ECG07: 404 MS
QRS DURATION, ECG06: 94 MS
QTC CALCULATION (BEZET), ECG08: 413 MS
RBC # BLD AUTO: 4.26 M/UL (ref 4.2–5.3)
RBC #/AREA URNS HPF: NEGATIVE /HPF (ref 0–5)
SODIUM SERPL-SCNC: 143 MMOL/L (ref 136–145)
SP GR UR REFRACTOMETRY: 1.02 (ref 1–1.03)
SPECIMEN EXP DATE BLD: NORMAL
UROBILINOGEN UR QL STRIP.AUTO: 1 EU/DL (ref 0.2–1)
VENTRICULAR RATE, ECG03: 63 BPM
WBC # BLD AUTO: 3.7 K/UL (ref 4.6–13.2)
WBC URNS QL MICRO: NORMAL /HPF (ref 0–5)

## 2022-07-01 PROCEDURE — 85025 COMPLETE CBC W/AUTO DIFF WBC: CPT

## 2022-07-01 PROCEDURE — 87086 URINE CULTURE/COLONY COUNT: CPT

## 2022-07-01 PROCEDURE — 86900 BLOOD TYPING SEROLOGIC ABO: CPT

## 2022-07-01 PROCEDURE — 85730 THROMBOPLASTIN TIME PARTIAL: CPT

## 2022-07-01 PROCEDURE — 93005 ELECTROCARDIOGRAM TRACING: CPT

## 2022-07-01 PROCEDURE — 36415 COLL VENOUS BLD VENIPUNCTURE: CPT

## 2022-07-01 PROCEDURE — 85610 PROTHROMBIN TIME: CPT

## 2022-07-01 PROCEDURE — 80053 COMPREHEN METABOLIC PANEL: CPT

## 2022-07-01 PROCEDURE — 81001 URINALYSIS AUTO W/SCOPE: CPT

## 2022-07-02 LAB
BACTERIA SPEC CULT: NORMAL
SERVICE CMNT-IMP: NORMAL
SERVICE CMNT-IMP: NORMAL

## 2022-07-07 ENCOUNTER — HOSPITAL ENCOUNTER (OUTPATIENT)
Dept: PREADMISSION TESTING | Age: 57
Discharge: HOME | End: 2022-07-07

## 2022-07-07 VITALS — WEIGHT: 204 LBS | BODY MASS INDEX: 41.12 KG/M2 | HEIGHT: 59 IN

## 2022-07-07 RX ORDER — METOPROLOL SUCCINATE 50 MG/1
50 TABLET, EXTENDED RELEASE ORAL DAILY
COMMUNITY

## 2022-07-07 RX ORDER — FLUTICASONE PROPIONATE 50 MCG
2 SPRAY, SUSPENSION (ML) NASAL DAILY
COMMUNITY
Start: 2022-05-31

## 2022-07-07 RX ORDER — SODIUM CHLORIDE, SODIUM LACTATE, POTASSIUM CHLORIDE, CALCIUM CHLORIDE 600; 310; 30; 20 MG/100ML; MG/100ML; MG/100ML; MG/100ML
125 INJECTION, SOLUTION INTRAVENOUS CONTINUOUS
Status: CANCELLED | OUTPATIENT
Start: 2022-07-07

## 2022-07-07 RX ORDER — CYCLOBENZAPRINE HCL 10 MG
20 TABLET ORAL
COMMUNITY
Start: 2022-06-28 | End: 2022-07-21

## 2022-07-07 RX ORDER — FUROSEMIDE 20 MG/1
20 TABLET ORAL AS NEEDED
COMMUNITY

## 2022-07-07 RX ORDER — TRAZODONE HYDROCHLORIDE 150 MG/1
1 TABLET ORAL
COMMUNITY
Start: 2021-12-14

## 2022-07-07 RX ORDER — CEFAZOLIN SODIUM/WATER 2 G/20 ML
2 SYRINGE (ML) INTRAVENOUS ONCE
Status: CANCELLED | OUTPATIENT
Start: 2022-07-07 | End: 2022-07-07

## 2022-07-07 RX ORDER — LEVOCETIRIZINE DIHYDROCHLORIDE 5 MG/1
5 TABLET, FILM COATED ORAL EVERY EVENING
COMMUNITY
Start: 2022-05-05 | End: 2023-05-05

## 2022-07-07 NOTE — PERIOP NOTES
No sleep apnea, removable prosthetic devices or family history of malignant hyperthermia. CHG kit and instructions given and reviewed. PCP is aware of the surgery. No participation in clinical trial or research study. Do not bring any valuables on DOS- jewelry, wallet, cash, laptop, medications. Possible time delay day of surgery reviewed. Covid card- not vaccinated DNR status-none.

## 2022-07-13 ENCOUNTER — ANESTHESIA EVENT (OUTPATIENT)
Dept: SURGERY | Age: 57
End: 2022-07-13
Payer: MEDICARE

## 2022-07-14 ENCOUNTER — ANESTHESIA (OUTPATIENT)
Dept: SURGERY | Age: 57
End: 2022-07-14
Payer: MEDICARE

## 2022-07-19 NOTE — PERIOP NOTES
LM asking for History and Physical  And Consent forms to be fax over for tomorrows cases. Was told they would be sending them within the hour. I will scam them in as soon as I recieve them.

## 2022-07-20 ENCOUNTER — HOSPITAL ENCOUNTER (OUTPATIENT)
Age: 57
Setting detail: OBSERVATION
Discharge: HOME HEALTH | End: 2022-07-21
Attending: ORTHOPAEDIC SURGERY | Admitting: ORTHOPAEDIC SURGERY
Payer: MEDICARE

## 2022-07-20 ENCOUNTER — APPOINTMENT (OUTPATIENT)
Dept: GENERAL RADIOLOGY | Age: 57
End: 2022-07-20
Attending: PHYSICIAN ASSISTANT
Payer: MEDICARE

## 2022-07-20 DIAGNOSIS — Z96.652 TOTAL KNEE REPLACEMENT STATUS, LEFT: Primary | ICD-10-CM

## 2022-07-20 LAB
ABO + RH BLD: NORMAL
BLOOD GROUP ANTIBODIES SERPL: NORMAL
SPECIMEN EXP DATE BLD: NORMAL

## 2022-07-20 PROCEDURE — C9290 INJ, BUPIVACAINE LIPOSOME: HCPCS | Performed by: ORTHOPAEDIC SURGERY

## 2022-07-20 PROCEDURE — 64447 NJX AA&/STRD FEMORAL NRV IMG: CPT | Performed by: ORTHOPAEDIC SURGERY

## 2022-07-20 PROCEDURE — C1713 ANCHOR/SCREW BN/BN,TIS/BN: HCPCS | Performed by: ORTHOPAEDIC SURGERY

## 2022-07-20 PROCEDURE — 77030002933 HC SUT MCRYL J&J -A: Performed by: ORTHOPAEDIC SURGERY

## 2022-07-20 PROCEDURE — 73560 X-RAY EXAM OF KNEE 1 OR 2: CPT

## 2022-07-20 PROCEDURE — 77030011628: Performed by: ORTHOPAEDIC SURGERY

## 2022-07-20 PROCEDURE — C1776 JOINT DEVICE (IMPLANTABLE): HCPCS | Performed by: ORTHOPAEDIC SURGERY

## 2022-07-20 PROCEDURE — 74011000250 HC RX REV CODE- 250: Performed by: ORTHOPAEDIC SURGERY

## 2022-07-20 PROCEDURE — 76210000006 HC OR PH I REC 0.5 TO 1 HR: Performed by: ORTHOPAEDIC SURGERY

## 2022-07-20 PROCEDURE — 74011000250 HC RX REV CODE- 250: Performed by: PHYSICIAN ASSISTANT

## 2022-07-20 PROCEDURE — 97535 SELF CARE MNGMENT TRAINING: CPT

## 2022-07-20 PROCEDURE — 65270000029 HC RM PRIVATE

## 2022-07-20 PROCEDURE — 76942 ECHO GUIDE FOR BIOPSY: CPT | Performed by: ORTHOPAEDIC SURGERY

## 2022-07-20 PROCEDURE — 74011000250 HC RX REV CODE- 250: Performed by: ANESTHESIOLOGY

## 2022-07-20 PROCEDURE — 74011250637 HC RX REV CODE- 250/637: Performed by: PHYSICIAN ASSISTANT

## 2022-07-20 PROCEDURE — 97116 GAIT TRAINING THERAPY: CPT

## 2022-07-20 PROCEDURE — 74011250636 HC RX REV CODE- 250/636

## 2022-07-20 PROCEDURE — 77030027138 HC INCENT SPIROMETER -A: Performed by: ORTHOPAEDIC SURGERY

## 2022-07-20 PROCEDURE — 77030013708 HC HNDPC SUC IRR PULS STRY –B: Performed by: ORTHOPAEDIC SURGERY

## 2022-07-20 PROCEDURE — 77030040361 HC SLV COMPR DVT MDII -B: Performed by: ORTHOPAEDIC SURGERY

## 2022-07-20 PROCEDURE — 77030011265 HC ELECTRD BLD HEX COVD -A: Performed by: ORTHOPAEDIC SURGERY

## 2022-07-20 PROCEDURE — 77030040815: Performed by: ORTHOPAEDIC SURGERY

## 2022-07-20 PROCEDURE — 74011000258 HC RX REV CODE- 258: Performed by: ORTHOPAEDIC SURGERY

## 2022-07-20 PROCEDURE — G0378 HOSPITAL OBSERVATION PER HR: HCPCS

## 2022-07-20 PROCEDURE — 74011250636 HC RX REV CODE- 250/636: Performed by: ORTHOPAEDIC SURGERY

## 2022-07-20 PROCEDURE — 77030020269 HC MISC IMPL: Performed by: ORTHOPAEDIC SURGERY

## 2022-07-20 PROCEDURE — 77030031139 HC SUT VCRL2 J&J -A: Performed by: ORTHOPAEDIC SURGERY

## 2022-07-20 PROCEDURE — 77030020782 HC GWN BAIR PAWS FLX 3M -B: Performed by: ORTHOPAEDIC SURGERY

## 2022-07-20 PROCEDURE — 97530 THERAPEUTIC ACTIVITIES: CPT

## 2022-07-20 PROCEDURE — 74011250636 HC RX REV CODE- 250/636: Performed by: ANESTHESIOLOGY

## 2022-07-20 PROCEDURE — 97165 OT EVAL LOW COMPLEX 30 MIN: CPT

## 2022-07-20 PROCEDURE — 74011000250 HC RX REV CODE- 250

## 2022-07-20 PROCEDURE — 36415 COLL VENOUS BLD VENIPUNCTURE: CPT

## 2022-07-20 PROCEDURE — 77010033678 HC OXYGEN DAILY

## 2022-07-20 PROCEDURE — 76010000132 HC OR TIME 2.5 TO 3 HR: Performed by: ORTHOPAEDIC SURGERY

## 2022-07-20 PROCEDURE — 74011250636 HC RX REV CODE- 250/636: Performed by: PHYSICIAN ASSISTANT

## 2022-07-20 PROCEDURE — 77030038010: Performed by: ORTHOPAEDIC SURGERY

## 2022-07-20 PROCEDURE — 74011000272 HC RX REV CODE- 272: Performed by: ORTHOPAEDIC SURGERY

## 2022-07-20 PROCEDURE — 76060000036 HC ANESTHESIA 2.5 TO 3 HR: Performed by: ORTHOPAEDIC SURGERY

## 2022-07-20 PROCEDURE — 97161 PT EVAL LOW COMPLEX 20 MIN: CPT

## 2022-07-20 PROCEDURE — 77030033067 HC SUT PDO STRATFX SPIR J&J -B: Performed by: ORTHOPAEDIC SURGERY

## 2022-07-20 PROCEDURE — 2709999900 HC NON-CHARGEABLE SUPPLY: Performed by: ORTHOPAEDIC SURGERY

## 2022-07-20 PROCEDURE — 77030000032 HC CUF TRNQT ZIMM -B: Performed by: ORTHOPAEDIC SURGERY

## 2022-07-20 PROCEDURE — 77030034479 HC ADH SKN CLSR PRINEO J&J -B: Performed by: ORTHOPAEDIC SURGERY

## 2022-07-20 PROCEDURE — 77030014144 HC TY SPN ANES BBMI -B: Performed by: ANESTHESIOLOGY

## 2022-07-20 PROCEDURE — 86900 BLOOD TYPING SEROLOGIC ABO: CPT

## 2022-07-20 DEVICE — CEMENT BNE 20GM HALF DOSE PMMA W/ GENT HI VISC RADPQ FAST: Type: IMPLANTABLE DEVICE | Site: KNEE | Status: FUNCTIONAL

## 2022-07-20 DEVICE — JOURNEY II BCS FEMORAL OXINIUM                                    LEFT SIZE 4
Type: IMPLANTABLE DEVICE | Site: KNEE | Status: FUNCTIONAL
Brand: JOURNEY

## 2022-07-20 DEVICE — KNEE K1 TOT HEMI STD CEM IMPL CAPPED K1 SN: Type: IMPLANTABLE DEVICE | Site: KNEE | Status: FUNCTIONAL

## 2022-07-20 DEVICE — JOURNEY TIBIAL BASEPLATE NONPOROUS                                    LT SZ 2
Type: IMPLANTABLE DEVICE | Site: KNEE | Status: FUNCTIONAL
Brand: JOURNEY

## 2022-07-20 DEVICE — JOURNEY II BCS CONSTRAINED                                    ARTICULAR INSERT SIZE 1-2 LEFT 11MM
Type: IMPLANTABLE DEVICE | Site: KNEE | Status: FUNCTIONAL
Brand: JOURNEY

## 2022-07-20 DEVICE — GENESIS II LONG STEM 10MM X 70MM
Type: IMPLANTABLE DEVICE | Site: KNEE | Status: FUNCTIONAL
Brand: GENESIS II

## 2022-07-20 DEVICE — CEMENT BNE 40GM FULL DOSE PMMA W/O ANTIBIO HI VISC N RADPQ: Type: IMPLANTABLE DEVICE | Site: KNEE | Status: FUNCTIONAL

## 2022-07-20 DEVICE — GENESIS II OVAL RESURFACING                                    PATELLAR 32MM
Type: IMPLANTABLE DEVICE | Site: KNEE | Status: FUNCTIONAL
Brand: GENESIS II

## 2022-07-20 RX ORDER — MIDAZOLAM HYDROCHLORIDE 1 MG/ML
INJECTION, SOLUTION INTRAMUSCULAR; INTRAVENOUS AS NEEDED
Status: DISCONTINUED | OUTPATIENT
Start: 2022-07-20 | End: 2022-07-20 | Stop reason: HOSPADM

## 2022-07-20 RX ORDER — ONDANSETRON 2 MG/ML
4 INJECTION INTRAMUSCULAR; INTRAVENOUS
Status: DISCONTINUED | OUTPATIENT
Start: 2022-07-20 | End: 2022-07-21 | Stop reason: HOSPADM

## 2022-07-20 RX ORDER — AMOXICILLIN 250 MG
1 CAPSULE ORAL 2 TIMES DAILY
Status: DISCONTINUED | OUTPATIENT
Start: 2022-07-20 | End: 2022-07-21 | Stop reason: HOSPADM

## 2022-07-20 RX ORDER — SODIUM CHLORIDE 0.9 % (FLUSH) 0.9 %
5-40 SYRINGE (ML) INJECTION EVERY 8 HOURS
Status: DISCONTINUED | OUTPATIENT
Start: 2022-07-20 | End: 2022-07-20 | Stop reason: HOSPADM

## 2022-07-20 RX ORDER — FUROSEMIDE 20 MG/1
20 TABLET ORAL AS NEEDED
Status: DISCONTINUED | OUTPATIENT
Start: 2022-07-20 | End: 2022-07-21 | Stop reason: HOSPADM

## 2022-07-20 RX ORDER — LORAZEPAM 0.5 MG/1
0.5 TABLET ORAL
Status: DISCONTINUED | OUTPATIENT
Start: 2022-07-20 | End: 2022-07-21 | Stop reason: HOSPADM

## 2022-07-20 RX ORDER — EPHEDRINE SULFATE/0.9% NACL/PF 50 MG/5 ML
SYRINGE (ML) INTRAVENOUS AS NEEDED
Status: DISCONTINUED | OUTPATIENT
Start: 2022-07-20 | End: 2022-07-20 | Stop reason: HOSPADM

## 2022-07-20 RX ORDER — SODIUM CHLORIDE 0.9 G/100ML
IRRIGANT IRRIGATION AS NEEDED
Status: DISCONTINUED | OUTPATIENT
Start: 2022-07-20 | End: 2022-07-20 | Stop reason: HOSPADM

## 2022-07-20 RX ORDER — SODIUM CHLORIDE, SODIUM LACTATE, POTASSIUM CHLORIDE, CALCIUM CHLORIDE 600; 310; 30; 20 MG/100ML; MG/100ML; MG/100ML; MG/100ML
100 INJECTION, SOLUTION INTRAVENOUS CONTINUOUS
Status: DISCONTINUED | OUTPATIENT
Start: 2022-07-20 | End: 2022-07-21 | Stop reason: HOSPADM

## 2022-07-20 RX ORDER — SODIUM CHLORIDE, SODIUM LACTATE, POTASSIUM CHLORIDE, CALCIUM CHLORIDE 600; 310; 30; 20 MG/100ML; MG/100ML; MG/100ML; MG/100ML
125 INJECTION, SOLUTION INTRAVENOUS CONTINUOUS
Status: DISCONTINUED | OUTPATIENT
Start: 2022-07-20 | End: 2022-07-20

## 2022-07-20 RX ORDER — BUPIVACAINE HYDROCHLORIDE 2.5 MG/ML
INJECTION, SOLUTION EPIDURAL; INFILTRATION; INTRACAUDAL AS NEEDED
Status: DISCONTINUED | OUTPATIENT
Start: 2022-07-20 | End: 2022-07-20 | Stop reason: HOSPADM

## 2022-07-20 RX ORDER — ALBUTEROL SULFATE 0.83 MG/ML
2.5 SOLUTION RESPIRATORY (INHALATION) AS NEEDED
Status: DISCONTINUED | OUTPATIENT
Start: 2022-07-20 | End: 2022-07-20 | Stop reason: HOSPADM

## 2022-07-20 RX ORDER — TRANEXAMIC ACID 10 MG/ML
1 INJECTION, SOLUTION INTRAVENOUS
Status: COMPLETED | OUTPATIENT
Start: 2022-07-20 | End: 2022-07-20

## 2022-07-20 RX ORDER — SODIUM CHLORIDE 0.9 % (FLUSH) 0.9 %
5-40 SYRINGE (ML) INJECTION AS NEEDED
Status: DISCONTINUED | OUTPATIENT
Start: 2022-07-20 | End: 2022-07-20 | Stop reason: HOSPADM

## 2022-07-20 RX ORDER — SODIUM CHLORIDE, SODIUM LACTATE, POTASSIUM CHLORIDE, CALCIUM CHLORIDE 600; 310; 30; 20 MG/100ML; MG/100ML; MG/100ML; MG/100ML
75 INJECTION, SOLUTION INTRAVENOUS CONTINUOUS
Status: DISCONTINUED | OUTPATIENT
Start: 2022-07-20 | End: 2022-07-20 | Stop reason: HOSPADM

## 2022-07-20 RX ORDER — LANOLIN ALCOHOL/MO/W.PET/CERES
1 CREAM (GRAM) TOPICAL
Status: DISCONTINUED | OUTPATIENT
Start: 2022-07-21 | End: 2022-07-21 | Stop reason: HOSPADM

## 2022-07-20 RX ORDER — GABAPENTIN 300 MG/1
300 CAPSULE ORAL 3 TIMES DAILY
Status: DISCONTINUED | OUTPATIENT
Start: 2022-07-20 | End: 2022-07-21 | Stop reason: HOSPADM

## 2022-07-20 RX ORDER — LIDOCAINE HYDROCHLORIDE 20 MG/ML
INJECTION, SOLUTION EPIDURAL; INFILTRATION; INTRACAUDAL; PERINEURAL AS NEEDED
Status: DISCONTINUED | OUTPATIENT
Start: 2022-07-20 | End: 2022-07-20 | Stop reason: HOSPADM

## 2022-07-20 RX ORDER — CEFAZOLIN SODIUM/WATER 2 G/20 ML
2 SYRINGE (ML) INTRAVENOUS ONCE
Status: COMPLETED | OUTPATIENT
Start: 2022-07-20 | End: 2022-07-20

## 2022-07-20 RX ORDER — NALOXONE HYDROCHLORIDE 0.4 MG/ML
0.4 INJECTION, SOLUTION INTRAMUSCULAR; INTRAVENOUS; SUBCUTANEOUS AS NEEDED
Status: DISCONTINUED | OUTPATIENT
Start: 2022-07-20 | End: 2022-07-21 | Stop reason: HOSPADM

## 2022-07-20 RX ORDER — OXYCODONE HYDROCHLORIDE 5 MG/1
10 TABLET ORAL
Status: DISCONTINUED | OUTPATIENT
Start: 2022-07-20 | End: 2022-07-21 | Stop reason: HOSPADM

## 2022-07-20 RX ORDER — OXYCODONE HYDROCHLORIDE 5 MG/1
5 TABLET ORAL
Status: DISCONTINUED | OUTPATIENT
Start: 2022-07-20 | End: 2022-07-21 | Stop reason: HOSPADM

## 2022-07-20 RX ORDER — DIPHENHYDRAMINE HCL 25 MG
25 CAPSULE ORAL
Status: DISCONTINUED | OUTPATIENT
Start: 2022-07-20 | End: 2022-07-21 | Stop reason: HOSPADM

## 2022-07-20 RX ORDER — BUPIVACAINE HYDROCHLORIDE 7.5 MG/ML
INJECTION, SOLUTION INTRASPINAL
Status: COMPLETED | OUTPATIENT
Start: 2022-07-20 | End: 2022-07-20

## 2022-07-20 RX ORDER — FENTANYL CITRATE 50 UG/ML
50 INJECTION, SOLUTION INTRAMUSCULAR; INTRAVENOUS
Status: DISCONTINUED | OUTPATIENT
Start: 2022-07-20 | End: 2022-07-20 | Stop reason: HOSPADM

## 2022-07-20 RX ORDER — METOPROLOL SUCCINATE 50 MG/1
50 TABLET, EXTENDED RELEASE ORAL DAILY
Status: DISCONTINUED | OUTPATIENT
Start: 2022-07-21 | End: 2022-07-21 | Stop reason: HOSPADM

## 2022-07-20 RX ORDER — KETOROLAC TROMETHAMINE 30 MG/ML
15 INJECTION, SOLUTION INTRAMUSCULAR; INTRAVENOUS EVERY 6 HOURS
Status: DISCONTINUED | OUTPATIENT
Start: 2022-07-20 | End: 2022-07-21 | Stop reason: HOSPADM

## 2022-07-20 RX ORDER — CEFAZOLIN SODIUM/WATER 2 G/20 ML
2 SYRINGE (ML) INTRAVENOUS EVERY 8 HOURS
Status: COMPLETED | OUTPATIENT
Start: 2022-07-20 | End: 2022-07-21

## 2022-07-20 RX ORDER — PANTOPRAZOLE SODIUM 40 MG/1
40 TABLET, DELAYED RELEASE ORAL
Status: DISCONTINUED | OUTPATIENT
Start: 2022-07-21 | End: 2022-07-21 | Stop reason: HOSPADM

## 2022-07-20 RX ORDER — INSULIN LISPRO 100 [IU]/ML
INJECTION, SOLUTION INTRAVENOUS; SUBCUTANEOUS ONCE
Status: DISCONTINUED | OUTPATIENT
Start: 2022-07-20 | End: 2022-07-20 | Stop reason: HOSPADM

## 2022-07-20 RX ORDER — SODIUM CHLORIDE 0.9 % (FLUSH) 0.9 %
5-40 SYRINGE (ML) INJECTION AS NEEDED
Status: DISCONTINUED | OUTPATIENT
Start: 2022-07-20 | End: 2022-07-21 | Stop reason: HOSPADM

## 2022-07-20 RX ORDER — ATORVASTATIN CALCIUM 20 MG/1
20 TABLET, FILM COATED ORAL DAILY
Status: DISCONTINUED | OUTPATIENT
Start: 2022-07-21 | End: 2022-07-21 | Stop reason: HOSPADM

## 2022-07-20 RX ORDER — PROPOFOL 10 MG/ML
INJECTION, EMULSION INTRAVENOUS
Status: DISCONTINUED | OUTPATIENT
Start: 2022-07-20 | End: 2022-07-20 | Stop reason: HOSPADM

## 2022-07-20 RX ORDER — HYDROMORPHONE HYDROCHLORIDE 1 MG/ML
1 INJECTION, SOLUTION INTRAMUSCULAR; INTRAVENOUS; SUBCUTANEOUS
Status: DISCONTINUED | OUTPATIENT
Start: 2022-07-20 | End: 2022-07-21 | Stop reason: HOSPADM

## 2022-07-20 RX ORDER — KETAMINE HCL IN 0.9 % NACL 50 MG/5 ML
SYRINGE (ML) INTRAVENOUS AS NEEDED
Status: DISCONTINUED | OUTPATIENT
Start: 2022-07-20 | End: 2022-07-20 | Stop reason: HOSPADM

## 2022-07-20 RX ORDER — FENTANYL CITRATE 50 UG/ML
25 INJECTION, SOLUTION INTRAMUSCULAR; INTRAVENOUS AS NEEDED
Status: DISCONTINUED | OUTPATIENT
Start: 2022-07-20 | End: 2022-07-20 | Stop reason: HOSPADM

## 2022-07-20 RX ORDER — DEXAMETHASONE SODIUM PHOSPHATE 10 MG/ML
INJECTION INTRAMUSCULAR; INTRAVENOUS
Status: COMPLETED | OUTPATIENT
Start: 2022-07-20 | End: 2022-07-20

## 2022-07-20 RX ORDER — DEXMEDETOMIDINE HYDROCHLORIDE 100 UG/ML
INJECTION, SOLUTION INTRAVENOUS
Status: COMPLETED | OUTPATIENT
Start: 2022-07-20 | End: 2022-07-20

## 2022-07-20 RX ORDER — ROPIVACAINE HYDROCHLORIDE 5 MG/ML
INJECTION, SOLUTION EPIDURAL; INFILTRATION; PERINEURAL
Status: COMPLETED | OUTPATIENT
Start: 2022-07-20 | End: 2022-07-20

## 2022-07-20 RX ORDER — SODIUM CHLORIDE, SODIUM LACTATE, POTASSIUM CHLORIDE, AND CALCIUM CHLORIDE .6; .31; .03; .02 G/100ML; G/100ML; G/100ML; G/100ML
IRRIGANT IRRIGATION AS NEEDED
Status: DISCONTINUED | OUTPATIENT
Start: 2022-07-20 | End: 2022-07-20 | Stop reason: HOSPADM

## 2022-07-20 RX ORDER — PROPOFOL 10 MG/ML
INJECTION, EMULSION INTRAVENOUS AS NEEDED
Status: DISCONTINUED | OUTPATIENT
Start: 2022-07-20 | End: 2022-07-20 | Stop reason: HOSPADM

## 2022-07-20 RX ORDER — SODIUM CHLORIDE 0.9 % (FLUSH) 0.9 %
5-40 SYRINGE (ML) INJECTION EVERY 8 HOURS
Status: DISCONTINUED | OUTPATIENT
Start: 2022-07-20 | End: 2022-07-21 | Stop reason: HOSPADM

## 2022-07-20 RX ORDER — MAGNESIUM SULFATE 100 %
4 CRYSTALS MISCELLANEOUS AS NEEDED
Status: DISCONTINUED | OUTPATIENT
Start: 2022-07-20 | End: 2022-07-20 | Stop reason: HOSPADM

## 2022-07-20 RX ADMIN — KETOROLAC TROMETHAMINE 15 MG: 30 INJECTION, SOLUTION INTRAMUSCULAR; INTRAVENOUS at 23:42

## 2022-07-20 RX ADMIN — LOSARTAN POTASSIUM: 50 TABLET, FILM COATED ORAL at 16:10

## 2022-07-20 RX ADMIN — PROPOFOL 20 MG: 10 INJECTION, EMULSION INTRAVENOUS at 12:11

## 2022-07-20 RX ADMIN — CEFAZOLIN 2 G: 10 INJECTION, POWDER, FOR SOLUTION INTRAVENOUS at 21:18

## 2022-07-20 RX ADMIN — SODIUM CHLORIDE, SODIUM LACTATE, POTASSIUM CHLORIDE, AND CALCIUM CHLORIDE 125 ML/HR: 600; 310; 30; 20 INJECTION, SOLUTION INTRAVENOUS at 09:05

## 2022-07-20 RX ADMIN — ONDANSETRON 4 MG: 2 INJECTION INTRAMUSCULAR; INTRAVENOUS at 21:19

## 2022-07-20 RX ADMIN — MIDAZOLAM 2 MG: 1 INJECTION INTRAMUSCULAR; INTRAVENOUS at 12:20

## 2022-07-20 RX ADMIN — DEXMEDETOMIDINE HYDROCHLORIDE 20 MCG: 100 INJECTION, SOLUTION INTRAVENOUS at 11:01

## 2022-07-20 RX ADMIN — MIDAZOLAM 2 MG: 1 INJECTION INTRAMUSCULAR; INTRAVENOUS at 10:55

## 2022-07-20 RX ADMIN — PROPOFOL 30 MG: 10 INJECTION, EMULSION INTRAVENOUS at 12:58

## 2022-07-20 RX ADMIN — Medication 10 MG: at 12:02

## 2022-07-20 RX ADMIN — KETOROLAC TROMETHAMINE 15 MG: 30 INJECTION, SOLUTION INTRAMUSCULAR; INTRAVENOUS at 18:12

## 2022-07-20 RX ADMIN — HYDROMORPHONE HYDROCHLORIDE 1 MG: 1 INJECTION, SOLUTION INTRAMUSCULAR; INTRAVENOUS; SUBCUTANEOUS at 21:18

## 2022-07-20 RX ADMIN — TRANEXAMIC ACID 1 G: 10 INJECTION, SOLUTION INTRAVENOUS at 14:16

## 2022-07-20 RX ADMIN — PROPOFOL 30 MG: 10 INJECTION, EMULSION INTRAVENOUS at 12:53

## 2022-07-20 RX ADMIN — Medication 5 MG: at 13:21

## 2022-07-20 RX ADMIN — PROPOFOL 25 MG: 10 INJECTION, EMULSION INTRAVENOUS at 12:14

## 2022-07-20 RX ADMIN — GABAPENTIN 300 MG: 300 CAPSULE ORAL at 21:18

## 2022-07-20 RX ADMIN — PROPOFOL 25 MG: 10 INJECTION, EMULSION INTRAVENOUS at 12:12

## 2022-07-20 RX ADMIN — Medication 5 MG: at 13:04

## 2022-07-20 RX ADMIN — LIDOCAINE HYDROCHLORIDE 50 MG: 20 INJECTION, SOLUTION INTRAVENOUS at 12:14

## 2022-07-20 RX ADMIN — GABAPENTIN 300 MG: 300 CAPSULE ORAL at 16:10

## 2022-07-20 RX ADMIN — PROPOFOL 30 MG: 10 INJECTION, EMULSION INTRAVENOUS at 12:27

## 2022-07-20 RX ADMIN — OXYCODONE 10 MG: 5 TABLET ORAL at 15:38

## 2022-07-20 RX ADMIN — Medication 10 MG: at 12:46

## 2022-07-20 RX ADMIN — Medication 10 MG: at 12:39

## 2022-07-20 RX ADMIN — LIDOCAINE HYDROCHLORIDE 50 MG: 20 INJECTION, SOLUTION INTRAVENOUS at 12:12

## 2022-07-20 RX ADMIN — Medication 2 G: at 12:11

## 2022-07-20 RX ADMIN — PROPOFOL 30 MG: 10 INJECTION, EMULSION INTRAVENOUS at 12:38

## 2022-07-20 RX ADMIN — PROPOFOL 30 MG: 10 INJECTION, EMULSION INTRAVENOUS at 12:45

## 2022-07-20 RX ADMIN — SODIUM CHLORIDE, POTASSIUM CHLORIDE, SODIUM LACTATE AND CALCIUM CHLORIDE 100 ML/HR: 600; 310; 30; 20 INJECTION, SOLUTION INTRAVENOUS at 15:39

## 2022-07-20 RX ADMIN — BUPIVACAINE HYDROCHLORIDE IN DEXTROSE 12.75 MG: 7.5 INJECTION, SOLUTION SUBARACHNOID at 12:07

## 2022-07-20 RX ADMIN — TRANEXAMIC ACID 1 G: 10 INJECTION, SOLUTION INTRAVENOUS at 12:11

## 2022-07-20 RX ADMIN — SODIUM CHLORIDE, SODIUM LACTATE, POTASSIUM CHLORIDE, AND CALCIUM CHLORIDE: 600; 310; 30; 20 INJECTION, SOLUTION INTRAVENOUS at 12:47

## 2022-07-20 RX ADMIN — ROPIVACAINE HYDROCHLORIDE 25 ML: 5 INJECTION, SOLUTION EPIDURAL; INFILTRATION; PERINEURAL at 11:01

## 2022-07-20 RX ADMIN — Medication 1 TABLET: at 21:18

## 2022-07-20 RX ADMIN — Medication 5 MG: at 13:38

## 2022-07-20 RX ADMIN — PROPOFOL 40 MCG/KG/MIN: 10 INJECTION, EMULSION INTRAVENOUS at 13:04

## 2022-07-20 RX ADMIN — DEXAMETHASONE SODIUM PHOSPHATE 4 MG: 10 INJECTION, SOLUTION INTRAMUSCULAR; INTRAVENOUS at 11:01

## 2022-07-20 RX ADMIN — SODIUM CHLORIDE, PRESERVATIVE FREE 10 ML: 5 INJECTION INTRAVENOUS at 22:00

## 2022-07-20 RX ADMIN — SODIUM CHLORIDE, PRESERVATIVE FREE 10 ML: 5 INJECTION INTRAVENOUS at 16:00

## 2022-07-20 NOTE — ANESTHESIA PROCEDURE NOTES
Spinal Block    Start time: 7/20/2022 11:54 AM  End time: 7/20/2022 12:07 PM  Performed by: Daphnie Diaz CRNA  Authorized by: Diallo Medina DO     Pre-procedure:   Indications: at surgeon's request, procedure for pain and primary anesthetic  Preanesthetic Checklist: patient identified, risks and benefits discussed, anesthesia consent, site marked, patient being monitored, timeout performed and fire risk safety assessment completed and verbalized    Timeout Time: 11:54 EDT      Spinal Block:   Patient Position:  Seated  Prep Region:  Lumbar  Prep: chlorhexidine and patient draped      Location:  L3-4  Technique:  Single shot  Local: bupivacaine 0.75% in dextrose 8.25% preserv-free (SENSORCAINE) Intrathecal - Intrathecal   12.75 mg - 7/20/2022 12:07:00 PM  Local Dose (mL):  5  Med Admin Time: 7/20/2022 12:07 PM    Needle:   Needle Type:  Pencan  Needle Gauge:  25 G  Attempts:  2      Events: CSF confirmed, no blood with aspiration and no paresthesia        Assessment:  Insertion:  Uncomplicated  Patient tolerance:  Patient tolerated the procedure well with no immediate complications

## 2022-07-20 NOTE — DISCHARGE SUMMARY
[address] [facility] [address]     DISCHARGE SUMMARY     PATIENT: Roger Lea     MRN: [id number]   ADMIT DATE: 2022   BILLIN   DISCHARGE DATE: 2022     ATTENDING: Zaira Jones MD   DICTATING: Ashia Metcalf PA-C     ADMISSION DIAGNOSIS: Total knee replacement status, left [Z96.652]    DISCHARGE DIAGNOSIS: Status post OSTEOARTHRITIS LEFT KNEE    HISTORY OF PRESENT ILLNESS: The patient is a 55y.o. year-old female   with ongoing left knee pain secondary to osteoarthritis of her left knee. The patient's pain has persisted and progressed despite conservative treatments and therapies. The patient has at this time opted for surgical intervention.     PAST MEDICAL HISTORY:   Past Medical History:   Diagnosis Date    Allergies 2007    Arthritis of knee, left     Arthritis of right hip     Autoimmune disease ([facility] 2016    fibromyalgia    Chronic back pain     Fibromyalgia     GERD (gastroesophageal reflux disease)     Hypercholesteremia     Hypertension 1990s    Intestinal malabsorption     Morbid obesity ([facility] [address]     Morbid obesity with body mass index of 50.0-59.9 in [facility])     Seizures ([facility] [address] 2018    post surgery, due to low K, treated and resolved    Status post bariatric surgery 2016    sleeve resection / terracina       PAST SURGICAL HISTORY:   Past Surgical History:   Procedure Laterality Date    HX Jairon Worthington 6    HX GI  2016    sleeve-Dr. Barry Richards HERNIA REPAIR  2018    incisional  with mesh    HX HYSTERECTOMY      RICCI    LAPAROSCOPY ABDOMEN DIAGNOSTIC  2018    aborted gastric bypass / terracina    [place] TOTAL HIP ARTHROPLASTY Right 2017       ALLERGIES:   Allergies   Allergen Reactions    Tape [Adhesive] Swelling     Patient reported that eyes were swollen    Vicodin [Hydrocodone-Acetaminophen] Hives    Tylenol [Acetaminophen] Rash    Aspirin Rash        CURRENT MEDICATIONS:  A list of medications prior to the time of admission include:  Prior to Admission medications    Medication Sig Start Date End Date Taking? Authorizing Provider   metoprolol succinate (TOPROL-XL) 50 mg XL tablet Take 50 mg by mouth daily. Indications: high blood pressure   Yes Provider, Historical   cyclobenzaprine (FLEXERIL) 10 mg tablet Take 20 mg by mouth nightly as needed. Indications: disorder characterized by stiff, tender & painful muscles, muscle spasm 6/28/22  Yes Provider, Historical   traZODone (DESYREL) 150 mg tablet Take 1 Tablet by mouth nightly as needed. Indications: sleep 12/14/21  Yes Provider, Historical   simvastatin (ZOCOR) 20 mg tablet Take 20 mg by mouth nightly. Indications: high cholesterol   Yes Provider, Historical   losartan-hydrochlorothiazide (HYZAAR) 100-25 mg per tablet Take 1 Tab by mouth daily. Yes Provider, Historical   gabapentin (NEURONTIN) 300 mg capsule Take 600 mg by mouth two (2) times a day. Indications: neuropathic pain   Yes Provider, Historical   furosemide (LASIX) 20 mg tablet Take 20 mg by mouth as needed. Indications: visible water retention    Provider, Historical   fluticasone propionate (FLONASE) 50 mcg/actuation nasal spray 2 Sprays by Nasal route daily. 5/31/22   Provider, Historical   levocetirizine (XYZAL) 5 mg tablet Take 5 mg by mouth every evening. 5/5/22 5/5/23  Provider, Historical   dicyclomine (BENTYL) 20 mg tablet Take 20 mg by mouth every eight (8) hours as needed for Pain. Provider, Historical   omeprazole (PRILOSEC) 20 mg capsule Take 20 mg by mouth daily. Indications: gastroesophageal reflux disease    Provider, Historical   cholecalciferol (VITAMIN D3) 25 mcg (1,000 unit) cap Take  by mouth daily. Provider, Historical   b complex vitamins tablet Take 1 Tab by mouth daily. Provider, Historical   Biotin 2,500 mcg cap Take  by mouth.     Provider, Historical   cyanocobalamin (VITAMIN B-12) 1,000 mcg sublingual tablet Take 1,000 mcg by mouth daily.    Provider, Historical   calcium citrate-vitamin d3 (CITRACAL+D) 315-200 mg-unit tab Take 1 Tab by mouth daily (with breakfast). Provider, Historical   multivitamin with iron (FLINTSTONES) chewable tablet Take 2 Tabs by mouth daily. Indications: VITAMIN DEFICIENCY PREVENTION    Provider, Historical       FAMILY HISTORY:   Family History   Problem Relation Age of Onset    Diabetes Mother     Hypertension Mother     Cancer Father     Lung Disease Sister        SOCIAL HISTORY:   Social History     Socioeconomic History    Marital status: SINGLE   Tobacco Use    Smoking status: Former     Types: Cigarettes     Quit date: 10/5/2015     Years since quittin.7    Smokeless tobacco: Never   Vaping Use    Vaping Use: Former    Quit date: 2015    Substances: Nicotine    Devices: Pre-filled or refillable cartridge   Substance and Sexual Activity    Alcohol use: No     Alcohol/week: 0.0 standard drinks     Comment: last drink in 2019    Drug use: Never    Sexual activity: Never       REVIEW OF SYSTEMS: All review of systems are negative. PHYSICAL EXAMINATION: For a detailed physical exam, please refer to the patient's chart. HOSPITAL COURSE: The patient was taken to surgery the day of admission. she underwent a left total knee replacement. Operative course was benign. Estimated blood loss was approximately 50 cc. The patient was taken to the PACU in stable condition and was later taken to the floor in stable condition. During her hospital stay, the patient progressed well with physical therapy and occupational therapy, adherent to instructions. she had been cleared by physical therapy with stair training. she was placed on Eliquis for DVT prophylaxis. her pain has been well controlled with oral pain medications. her vitals have remained stable. she has also remained hemodynamically stable. The patient has been recommended for discharge home on POD#2.      DISCHARGE INSTRUCTIONS: The patient is to be discharged home with home health. she is to continue on her prior medications per the medication reconciliation form, to which we will add:    Eliquis 2.5 mg; 1 tablet p.o. Twice daily X 2 weeks  Colace 100 mg po TID prn constipation  Percocet 5/325 mg; 1-2 tablets p.o. every 4 to 6 hours p.r.n. for pain  Narcan 4 mg/actuation; Use 1 spray intranasally, repeat with new spray every 2-3 min as needed for opioid overdose symptoms, alternating nostrils with each spray. The patient is to continue at home with home physical therapy 3 times a week to work on gait training, range of motion, strengthening, and weightbearing exercises as tolerated on her left lower extremity. The patient is to progress from a walker to a cane to complete total weightbearing as tolerable. The patient is to continue to keep her incision dry. The patient is to followup with Dr. Darlin Biggs in the office approximately 1-2 weeks status post for x-rays and further evaluation.     Ray Hernandez PA-C  07/21/2022 0800

## 2022-07-20 NOTE — PROGRESS NOTES
Problem: Self Care Deficits Care Plan (Adult)  Goal: *Acute Goals and Plan of Care (Insert Text)  Description: Occupational Therapy Goals  Initiated 7/20/2022 within 7 day(s). 1.  Patient will perform grooming with modified independence standing at sink for 5 minutes or more. 2.  Patient will perform lower body dressing with modified independence. 3.  Patient will perform toilet transfers with modified independence. 4.  Patient will perform all aspects of toileting with modified independence. 5.  Patient will utilize energy conservation techniques during functional activities with verbal, visual, and tactile cues. OCCUPATIONAL THERAPY EVALUATION    Patient: Aurora Lowe (55 y.o. female)  Date: 7/20/2022  Primary Diagnosis: Total knee replacement status, left [Z96.652]  Procedure(s) (LRB):  LEFT TOTAL KNEE ARTHROPLASTY AND ALL INDICATED PROCEDURES \"SPEC POP\" (Left) Day of Surgery   Precautions:   Fall, WBAT (Simultaneous filing. User may not have seen previous data.)  PLOF: MOD I for ADLs and transfers     ASSESSMENT :  Based on the objective data described below, the patient presents with decreased strength, endurance and balance for carryover of ADLs and transfers following L TKA. Pt co-treat with PT for the need of another set of skilled hands and safety with transfers/ADLs. Pt participate with bed mobility, supine<>sit, sit<>stand transfers, UB and LB dressing, toilet transfers, toileting, and grooming with CG-SBA during this session. Pt was left supine in bed at the end of session in NAD. Pt education on icing, elevation and safety precautions for ADLs and transfers reviewed. Pt verbalized understanding for the same. Patient will benefit from skilled intervention to address the above impairments.   Patient's rehabilitation potential is considered to be Good  Factors which may influence rehabilitation potential include:   [x]             None noted  []             Mental ability/status  [] Medical condition  []             Home/family situation and support systems  []             Safety awareness  []             Pain tolerance/management  []             Other:      PLAN :  Recommendations and Planned Interventions:   [x]               Self Care Training                  [x]      Therapeutic Activities  [x]               Functional Mobility Training   []      Cognitive Retraining  []               Therapeutic Exercises           [x]      Endurance Activities  [x]               Balance Training                    []      Neuromuscular Re-Education  []               Visual/Perceptual Training     [x]      Home Safety Training  [x]               Patient Education                   [x]      Family Training/Education  []               Other (comment):    Frequency/Duration: Patient will be followed by occupational therapy 1-2 times per day/4-7 days per week to address goals. Discharge Recommendations: Home Health  Further Equipment Recommendations for Discharge: N/A     SUBJECTIVE:   Patient stated I am ready to get up.     OBJECTIVE DATA SUMMARY:     Past Medical History:   Diagnosis Date    Allergies 2007    Arthritis of knee, left     Arthritis of right hip     Autoimmune disease ([facility] [address] 2016    fibromyalgia    Chronic back pain     Fibromyalgia     GERD (gastroesophageal reflux disease)     Hypercholesteremia     Hypertension 1990s    Intestinal malabsorption     Morbid obesity ([facility] [address]     Morbid obesity with body mass index of 50.0-59.9 in adult [facility])     Seizures ([facility] [address] 2018    post surgery, due to low K, treated and resolved    Status post bariatric surgery 5/2016    sleeve resection / terracina     Past Surgical History:   Procedure Laterality Date    HX Jairon Worthington 6    HX GI  5/2016    sleeve-Dr. Paulette Ceja HERNIA REPAIR  2018    incisional  with mesh    HX HYSTERECTOMY  1995    RICCI    LAPAROSCOPY ABDOMEN DIAGNOSTIC  2018    aborted gastric bypass / terracina    [place] TOTAL HIP ARTHROPLASTY Right 07/2017     Barriers to Learning/Limitations: None  Compensate with: visual, verbal, tactile, kinesthetic cues/model    Home Situation:   Home Situation  Home Environment: Apartment (Simultaneous filing. User may not have seen previous data.)  # Steps to Enter: 0 (Simultaneous filing. User may not have seen previous data.)  One/Two Story Residence: One story (Simultaneous filing. User may not have seen previous data.)  Living Alone: Yes (Simultaneous filing. User may not have seen previous data.)  Support Systems: Child(traci) (Simultaneous filing. User may not have seen previous data.)  Patient Expects to be Discharged to[de-identified] Home with home health (Simultaneous filing. User may not have seen previous data.)  Current DME Used/Available at Home: Cane, straight, Crutches, Walker, rolling (Simultaneous filing. User may not have seen previous data.)  Tub or Shower Type: Tub/Shower combination  []  Right hand dominant   []  Left hand dominant    Cognitive/Behavioral Status:  Neurologic State: Alert  Orientation Level: Oriented X4  Cognition: Appropriate for age attention/concentration; Follows commands  Safety/Judgement: Fall prevention    Skin: intact  Edema: none    Vision/Perceptual:    Tracking: Able to track stimulus in all quadrants w/o difficulty    Coordination: BUE  Coordination: Generally decreased, functional (Simultaneous filing. User may not have seen previous data.)  Fine Motor Skills-Upper: Left Intact; Right Intact    Gross Motor Skills-Upper: Left Intact; Right Intact  Balance:  Sitting: Intact (Simultaneous filing. User may not have seen previous data.)  Standing: Intact; With support (Simultaneous filing. User may not have seen previous data.)  Strength: BUE  Strength: Generally decreased, functional (Simultaneous filing. User may not have seen previous data.)  Tone & Sensation: BUE  Tone: Normal (Simultaneous filing.  User may not have seen previous data.)  Sensation: Impaired (Simultaneous filing. User may not have seen previous data.)  Range of Motion: BUE  AROM: Generally decreased, functional (Simultaneous filing. User may not have seen previous data.)  Functional Mobility and Transfers for ADLs:  Bed Mobility:  Supine to Sit: Contact guard assistance (Simultaneous filing. User may not have seen previous data.)  Sit to Supine: Contact guard assistance  Scooting: Contact guard assistance  Transfers:  Sit to Stand: Contact guard assistance (Simultaneous filing. User may not have seen previous data.)  Stand to Sit: Contact guard assistance (Simultaneous filing. User may not have seen previous data.)   Toilet Transfer : Contact guard assistance  ADL Assessment:   Feeding: Independent    Oral Facial Hygiene/Grooming: Contact guard assistance    Upper Body Dressing: Contact guard assistance    Lower Body Dressing: Contact guard assistance    Toileting: Contact guard assistance  ADL Intervention:  Grooming  Grooming Assistance: Contact guard assistance  Position Performed: Standing  Washing Hands: Contact guard assistance    Upper Body Dressing Assistance  Dressing Assistance: Contact guard assistance  Shirt simulation with hospital gown: Contact guard assistance    Lower Body Dressing Assistance  Dressing Assistance: Minimum assistance  Protective Undergarmet: Minimum assistance  Leg Crossed Method Used: No  Position Performed: Long sitting on bed  Cues: Almas Amas    Toileting  Toileting Assistance: Contact guard assistance  Bladder Hygiene: Contact guard assistance  Clothing Management: Contact guard assistance    Cognitive Retraining  Safety/Judgement: Fall prevention  Pain:  Pain level pre-treatment: 4/10   Pain level post-treatment: 3/10   Pain Intervention(s): Medication (see MAR); Rest, Ice, Repositioning   Response to intervention: Nurse notified, See doc flow    Activity Tolerance:   Good     Please refer to the flowsheet for vital signs taken during this treatment.   After treatment:   [] Patient left in no apparent distress sitting up in chair  [x] Patient left in no apparent distress in bed  [x] Call bell left within reach  [x] Nursing notified  [] Caregiver present  [] Bed alarm activated    COMMUNICATION/EDUCATION:   [x] Role of Occupational Therapy in the acute care setting  [x] Home safety education was provided and the patient/caregiver indicated understanding. [x] Patient/family have participated as able in goal setting and plan of care. [x] Patient/family agree to work toward stated goals and plan of care. [] Patient understands intent and goals of therapy, but is neutral about his/her participation. [] Patient is unable to participate in goal setting and plan of care. Thank you for this referral.  Jose Luis Bird, OTR/L  Time Calculation: 27 mins    Eval Complexity: History: LOW Complexity : Brief history review ; Examination: LOW Complexity : 1-3 performance deficits relating to physical, cognitive , or psychosocial skils that result in activity limitations and / or participation restrictions ;    Decision Making:LOW Complexity : No comorbidities that affect functional and no verbal or physical assistance needed to complete eval tasks

## 2022-07-20 NOTE — ANESTHESIA PROCEDURE NOTES
Peripheral Block    Start time: 7/20/2022 10:55 AM  End time: 7/20/2022 11:02 AM  Performed by: Luciana Armstrong DO  Authorized by: Luciana Armstrong DO       Pre-procedure: Indications: at surgeon's request and post-op pain management    Preanesthetic Checklist: patient identified, risks and benefits discussed, site marked, timeout performed, anesthesia consent given, patient being monitored and fire risk safety assessment completed and verbalized    Timeout Time: 10:55 EDT      Block Type:   Block Type: Adductor canal block  Laterality:  Left  Monitoring:  Standard ASA monitoring, responsive to questions, oxygen, continuous pulse ox, frequent vital sign checks and heart rate  Injection Technique:  Single shot  Procedures: ultrasound guided and nerve stimulator    Patient Position: supine  Prep: chlorhexidine    Location:  Mid thigh  Needle Type:  Stimuplex  Needle Gauge:  20 G  Needle Localization:  Nerve stimulator and ultrasound guidance  Medication Injected:  Ropivacaine (PF) (NAROPIN) 5 mg/mL (0.5 %) injection - Peripheral Nerve Block   25 mL - 7/20/2022 11:01:00 AM  dexamethasone (PF) (DECADRON) 10 mg/mL injection - Peripheral Nerve Block   4 mg - 7/20/2022 11:01:00 AM  dexmedeTOMidine (PRECEDEX) 100 mcg/mL iv solution - Peripheral Nerve Block   20 mcg - 7/20/2022 11:01:00 AM  Med Admin Time: 7/20/2022 11:01 AM    Assessment:  Number of attempts:  1  Injection Assessment:  Incremental injection every 5 mL, no paresthesia, ultrasound image on chart, no intravascular symptoms, negative aspiration for blood and local visualized surrounding nerve on ultrasound  Patient tolerance:  Patient tolerated the procedure well with no immediate complications  10 mL injected near NVM using nerve stim.

## 2022-07-20 NOTE — PROGRESS NOTES
1512  TRANSFER - IN REPORT:    Verbal report received from [address] on [address]  being received from PACU for routine post - op      Report consisted of patients Situation, Background, Assessment and   Recommendations(SBAR). Information from the following report(s) SBAR, Kardex, OR Summary, Procedure Summary, Intake/Output, MAR, and Recent Results was reviewed with the receiving nurse. Opportunity for questions and clarification was provided. Assessment completed upon patients arrival to unit and care assumed. 1 [facility] care of patient. Assessment complete at this time. Pt alert and oriented x 4. Lungs clear bilaterally on room air. Patient denies shortness of breath/chest pain. No numbness and tingling in all extremities. 18G IV to L. Hand dressing clean, dry, & intact. Stated pain 8/10. SCD's/ Jaylen hose in place. ABD, ACE dressing to R. Knee that is clean, dry, & intact. Incentive spirometer at bedside. Patient taught how to use breathing tool w/ proper demonstration. Instructed to use 10x Q1. Verbalized understanding. Last BM 07/19/2022. Patient oriented to room and call bell. Call bell within reach. Bed in lowest position. 1538  PRN 10 mg Oral Roxicodone given for 8/10 pain     Verbal and Bedside change of shift report given to [facility] by Primitivo Beard. Opportunity for questions were provided.

## 2022-07-20 NOTE — ADDENDUM NOTE
Addendum  created 07/20/22 1608 by Luis Dejesus CRNA    Clinical Note Signed, Diagnosis association updated, Intraprocedure Blocks edited, SmartForm saved

## 2022-07-20 NOTE — PERIOP NOTES
TRANSFER - OUT REPORT:    Verbal report given to [facility] RN (name) on Michelle Ch  being transferred to [address] [place] [address](unit) for routine post - op       Report consisted of patients Situation, Background, Assessment and   Recommendations(SBAR). Information from the following report(s) SBAR, Procedure Summary, Intake/Output, and MAR was reviewed with the receiving nurse. Lines:   Peripheral IV 07/20/22 Left Hand (Active)   Site Assessment Clean, dry, & intact 07/20/22 1500   Phlebitis Assessment 0 07/20/22 1500   Infiltration Assessment 0 07/20/22 1500   Dressing Status Clean, dry, & intact 07/20/22 1500   Dressing Type Transparent 07/20/22 1500   Hub Color/Line Status Infusing 07/20/22 1500   Alcohol Cap Used No 07/20/22 0908        Opportunity for questions and clarification was provided.       Patient transported with:   Registered Nurse

## 2022-07-20 NOTE — DISCHARGE INSTRUCTIONS
2 [facility]     Patient Discharge Instructions    Daria Alonzo / [id number] : 1965    Admitted 2022 Discharged: 2022     IF YOU HAVE ANY PROBLEMS ONCE YOU ARE AT HOME CALL THE FOLLOWING NUMBERS:   Main office number: [phone]    Your follow up appointment to see either Dr. Juanjose Coats is scheduled in 1-2 weeks. If you are unsure of your appointment date call the office at [phone]. Take Home Medications     Resume your home medictions as directed  A prescription for pain medication has been given   It is important that you take the medication exactly as they are prescribed. Keep your medication in the bottles provided by the pharmacist and keep a list of the medication names, dosages, and times to be taken in your wallet. Do not take other medications without consulting your doctor. Note:  If you have already received and/or filled a prescription for one or more of the medications you've received a prescription for when leaving the hospital, you may disguard the duplicate prescription. What to do at [address] [place] [address] Ave your prehospital diet. If you have excessive nausea or vomitting call your doctor's office     Wear portable sequential compressive devices at least 18 hours per day for 2 weeks. They may be used beyond 2 weeks as needed to help control swelling. ROSA ELENA hose should be worn during the day - may be removed for sleep. Should be worn on both legs for 2 weeks and then on the operative extremity for a total of 6 weeks. Begin In-Home Physical Therapy; 3 times a week to work on gait training, range of motion, strengthening, and weight bearing exercises as tolerable. Continue to use your walker or cane when walking. May progress from the walker to a cane to complete total bearing as tolerable. Keep incision DRY. You may need to sponge bathe to keep the incision dry.     When to Call    - Call if you have a temperature greater then 101  - Unable to keep food down  - Are unable to bear any wieght   - Need a pain medication refill     Information obtained by :  I understand that if any problems occur once I am at home I am to contact my physician. I understand and acknowledge receipt of the instructions indicated above.                                                                                                                                            Physician's or R.N.'s Signature                                                                  Date/Time                                                                                                                                              Patient or Representative Signature                                                          Date/Time

## 2022-07-20 NOTE — INTERVAL H&P NOTE
Update History & Physical    The Patient's History and Physical was reviewed with the patient and I examined the patient. There was no change. The surgical site was confirmed by the patient and me. Plan:  The risk, benefits, expected outcome, and alternative to the recommended procedure have been discussed with the patient. Patient understands and wants to proceed with the procedure.     Electronically signed by Tariq Chacon MD on 7/20/2022 at 9:59 AM

## 2022-07-20 NOTE — OP NOTES
[address]  [address] [place] [address], [phone]    LEFT TOTAL KNEE ARTHROPLASTY    Patient: Sigifredo Anderson MRN: [id number]  SSN: [id number]    YOB: 1965  Age: 55 y.o. Sex: female      Date of Surgery: 7/20/2022   Preoperative Diagnosis: OSTEOARTHRITIS LEFT KNEE   Postoperative Diagnosis: OSTEOARTHRITIS LEFT KNEE   Location: [facility]  Surgeon: Aakash Roberts MD  Physician Assistant: CHARITY Ly was medically necessary for holding of retractors for exposure and to complete the case. Assistant: Circ-1: Daina Albert RN  Physician Assistant: Bahman Luis PA-C  Scrub Tech-1: Jane Cunningham  Scrub Tech-Relief: Joseph Key  Retractor Adam: Pasha Carolina  Surg Asst-1: Dominga Ritter    Anesthesia: Spinal + Low femoral Nerve Block + local    Procedure: Left Total Knee Arthroplasty (CPT: 05266)    Findings:  Degenerative joint disease of the left knee     Estimated Blood Loss: 50 mL    Fluids: see anesthesia record    Specimens: None    Cultures: None    Complications: None    Tourniquet Time: 76 minutes    Tourniquet Pressure: 250 mm Hg    Tranexamic Acid: 1 gram IV: one dose at begining of case and one at the end    Exparel solution: 20 mL (13 mg/mL) + 40 mL NS    Implants:   Implant Name Type Inv.  Item Serial No.  Lot No. LRB No. Used Action   CEMENT BNE 40GM FULL DOSE PMMA W/O ANTIBIO HI VISC N RADPQ - [id number]  CEMENT BNE 40GM FULL DOSE PMMA W/O ANTIBIO HI VISC N RADPQ  [employer]_ [id number] Left 1 Implanted   CEMENT BNE 20GM HALF DOSE PMMA W/ GENT HI VISC RADPQ FAST - [id number]  CEMENT BNE 20GM HALF DOSE PMMA W/ GENT HI VISC RADPQ FAST  [facility] DEPUY SY[employer]ES OR[employer]_ [id number] Left 1 Implanted   BASEPLATE TIB SZ 2 [id number] ML64MM L KNEE NP SHITAL DASHAWNCornelia - [id number]  BASEPLATE TIB SZ 2 [id number] ML64MM L KNEE NP SHITAL [facility]  [employer]_ [id number] Left 1 Implanted   STEM FEM L70MM OD10MM LNG GEN II - [id number]  STEM FEM L70MM OD10MM LNG GEN II  [facility] AND NEPH ORTHOPAEDICS_ [id number] Left 1 Implanted   COMPONENT PAT [id number] THK9MM KNEE OVL RESURF GEN II [facility] - [id number]  COMPONENT PAT [id number] THK9MM KNEE OVL RESURF GEN II [facility]  [employer]_WD [id number] Left 1 Implanted   COMPONENT FEM SZ 4 L KNEE OXINIUM BI CRUCE STBL JOURNEY II - [id number]  COMPONENT FEM SZ 4 L KNEE OXINIUM BI CRUCE STBL [place] AND NEPH [employer]THOPAED[employer]_WD [id number] Left 1 Implanted   INSERT TIB SZ 1-2 [id number] LT KNEE BI CRUC STBL CONSTRN [employer] - [id number]  INSERT TIB SZ 1-2 [id number] LT KNEE BI CRUC STBL CONSTRN [employer] AND NE[employer]HE[employer] [facility] [id number] Left 1 Implanted        Patient Condition: Stable.    ---------  INDICATIONS:   This 55y.o.-year-old female has had pain in the left knee for years and has become functionally disabled with respect to the activities of daily living. The pain is increased with weight-bearing. The pain and dysfunction were not releaved by at least three months of conservative therapy such as analgesics (tylenol), structured flexibility and muscle strengthening physical therapy exercises, activity restrictions, intra-articular cortisone injections, bracing, and use of a cane. The radiographs showed varus alignment and advanced arthritis with joint space narrowing, subchondral sclerosis, and periarticular osteophytes. NSAIDs are contraindicated due to her history of gastric procedure. A left total knee replacement has been recommended. The risks and the possible complications of this surgery and anesthesia including, but not exclusive to, bleeding, infection, dislocation, fracture, blood clots, nerve and vascular injury, possible need for other procedures, and possibly death have been explained to the patient.   The patient accepts these risks for the benefits of joint replacement over the failure of non-operative care to provide adequate pain relief and improve their functional disability. The patients medical problems have been addressed by their primary care physician and are deemed stable and as well controlled as possible. Inpatient hospital care was medically necessary, reasonable, and appropriate. This is a medically necessary procedure. As such, without use of a surgical assistant to retract soft tissues, protect vital neuro-vascular structures and assist in the technical aspects of the operation, this procedure would not have been possible. Therefore this assistant was medically necessary. DESCRIPTION OF PROCEDURE:    After the risks and benefits of surgery were discussed, informed consent was obtained. The patient was identified in the preoperative holding area and the operative extremity was marked. Preoperatively a low femoral nerve block was performed by anesthesia. The patient was taken to the operating room and placed in the supine position. Spinal anesthesia was performed. IV antibiotics were given. After verification of the appropriate operative site from the consent form, with confirmation of surgeon, anesthesia and nursing teams, a tourniquet was placed and the left leg was prepped and draped in sterile fashion using Chlorhexidine, hydrogen peroxide and Chlorhexidine, hydrogen peroxide and Chloraprep. A formal timeout was performed. The tourniquet was inflated and a midline incision was made over the anterior knee medial to the tibial tubercle and the dissection was taken down to Nathaniel's fascia. A medial parapatellar arthrotomy was performed, medial release was made and the infrapatellar fat pad was trimmed. The anterior portions of the medial and lateral menisci were excised. The patella measured 21 mm. A freehand patellar cut was made followed by placement of the protective plate.   The knee was flexed and the patella was  allowed to subluxate into the lateral gutter and the knee was flexed. Inspection of the knee revealed cartilage loss from all three compartments greatest medially. The femur was drilled and intramedullary cutting jig was placed in 5 degrees of valgus and 9 mm of distal resection. The distal femoral cut was made. The tibia was then subluxed anteriorly with a large bent knee retractor. The PCL was released from the posterior tibia. The remaining medial and lateral menisci and the periarticular osteophytes were removed. The lateral genicular artery was cauterized. An intramedullary tibial guide was used and a tibial cut was made just inferior to the osteoarticular junction. The extension gap was assessed to ensure full extension could be achieved. The PCL was released. A sizing tibial plate was then pinned into place and alignment was checked. The tibia was reamed and broached and any additional osteophytes were removed. The femoral sizer was used to measure the femoral size as well as estimate femoral component rotation using the posterior condylar line as a reference. Gap measuring blocks and the gap /sizing device was then used to examine flexion and extension gaps and confirm femoral component size and rotation. Positioning pins for the distal femoral cutting block were placed into the femur through the /sizing device. The femoral cutting block was placed. Anterior-posterior position of the cutting guide was checked using a [employer]. The anterior, posterior and chamfer cuts were made. Posterior osteophytes were removed. Tibial and femoral trial implants were placed. The trochlear notch bone was removed. The patella preparation was then completed with sizing and drilling of the patellar lugs. The trial patellar implant was placed and measured 21 mm. Patellar tracking was midline so no lateral retinacular release was needed.   Ligament balancing was performed as needed with release of MCL done on approach as well as the PCL and popliteus. The PCL was sacrificed. A constrained BCS implant would be used. Excellent stability was achieved. The trial components were removed. 20 cc of Exparel solution and 10 cc of 0.25% Marcaine were injected into the posterior soft tissues. Care was taken to aspirate prior to injecting to prevent intravascular anesthetic injection. The cement was prepared. After preparing the bony surfaces with pulsatile lavage saline, the real components were cemented into place with the final liner. Excess cement was removed prior to hardening with the cement allowed to set up with axial pressure across the knee in full extension. The remaining 40 cc of Exparel solution and an additional 20 cc of 0.25% Marcaine were injected into the deep and superficial soft tissues around the knee as well as the periosteum. After drying of the cement, the knee was checked for any remaining excess cement and irrigated. The tourniquet was released and hemostasis was achieved. A standard layered closure was performed using #1 Stratafix PDS for the fascia, 0 and 3-0 Vicryl for the subcutaneous and subcuticular layers followed by a running subcuticular skin closure with a 3-0 Monocryl. PRINEO was applied. Sterile dressings were placed. The patient was awakened and there were no complications. Final sponge and needle counts were correct x2.     Emerita Phan MD

## 2022-07-20 NOTE — PERIOP NOTES
18 [facility] made aware that SBAR is ready for review. Patient assigned room #203.  Bibiana Morgan., RN will be the nurse

## 2022-07-20 NOTE — BRIEF OP NOTE
Brief Postoperative Note    Patient: Daria Alonzo  YOB: 1965  MRN: [id number]    Date of Procedure: 7/20/2022     Pre-Op Diagnosis: OSTEOARTHRITIS LEFT KNEE    Post-Op Diagnosis: Same as preoperative diagnosis. Procedure(s):  LEFT TOTAL KNEE ARTHROPLASTY AND ALL INDICATED PROCEDURES \"SPEC POP\"    Surgeon(s):  Rodney Forde MD    Surgical Assistant: Physician Assistant: Javier Eaton PA-C  Surg Asst-1: [facility] August    Anesthesia: Spinal     Estimated Blood Loss (mL): less than 50     Complications: None    Specimens: * No specimens in log *     Implants:   Implant Name Type Inv.  Item Serial No.  Lot No. LRB No. Used Action   CEMENT BNE 40GM FULL DOSE PMMA W/O ANTIBIO HI VISC N RADPQ - [id number]  CEMENT BNE 40GM FULL DOSE PMMA W/O ANTIBIO HI VISC N RADPQ  [employer]_ [id number] Left 1 Implanted   CEMENT BNE 20GM HALF DOSE PMMA W/ GENT HI VISC RADPQ FAST - [id number]  CEMENT BNE 20GM HALF DOSE PMMA W/ GENT HI VISC RADPQ FAST  [facility] DEPUY SYN[employer]ES ORTHOPED[employer]_ [id number] Left 1 Implanted   BASEPLATE TIB SZ 2 [id number] ML64MM L KNEE NP SHITAL JOURNEY - [id number]  BASEPLATE TIB SZ 2 [id number] ML64MM L KNEE NP SHITAL JOURNEY  [employer]_ [id number] Left 1 Implanted   STEM FEM L70MM OD10MM LNG GEN II - [id number]  STEM FEM L70MM OD10MM LNG GEN II  [employer]_ [id number] Left 1 Implanted   COMPONENT PAT PRR30HP THK9MM KNEE OVL RESURF GEN II [employer] - [id number]  COMPONENT PAT [id number] THK9MM KNEE OVL RESURF GEN II [employer]  [employer]_ [id number] Left 1 Implanted   COMPONENT FEM SZ 4 L KNEE OXINIUM BI CRUCE STBL JOURNEY II - [id number]  COMPONENT FEM SZ 4 L KNEE OXINIUM BI CRUCE STBL JOURNEY II  [employer]_ [id number] Left 1 Implanted   INSERT TIB SZ 1-2 [id number] LT KNEE BI CRUC STBL CONSTRN [facility] - [id number]  INSERT TIB SZ 1-2 [id number] LT KNEE BI CRUC STBL [facility]  [employer] ORTH[employer]_WD [id number] Left 1 Implanted       Drains: * No LDAs found *    Findings: DJD, poor ROM    Electronically Signed by Nir Yanez MD on 7/20/2022 at 2:00 PM

## 2022-07-20 NOTE — ANESTHESIA PREPROCEDURE EVALUATION
Relevant Problems   CARDIOVASCULAR   (+) Hypertension      GASTROINTESTINAL   (+) GERD (gastroesophageal reflux disease)      ENDOCRINE   (+) Arthritis of knee, left   (+) Arthritis of right hip   (+) Morbid obesity (HCC)       Anesthetic History   No history of anesthetic complications            Review of Systems / Medical History  Patient summary reviewed, nursing notes reviewed and pertinent labs reviewed    Pulmonary  Within defined limits                 Neuro/Psych     seizures: well controlled         Cardiovascular    Hypertension: well controlled              Exercise tolerance: >4 METS     GI/Hepatic/Renal     GERD: well controlled           Endo/Other        Morbid obesity and arthritis     Other Findings              Physical Exam    Airway  Mallampati: I  TM Distance: > 6 cm  Neck ROM: normal range of motion   Mouth opening: Normal     Cardiovascular    Rhythm: regular  Rate: normal         Dental  No notable dental hx       Pulmonary  Breath sounds clear to auscultation               Abdominal  GI exam deferred       Other Findings            Anesthetic Plan    ASA: 3  Anesthesia type: spinal      Post-op pain plan if not by surgeon: peripheral nerve block single    Induction: Intravenous  Anesthetic plan and risks discussed with: Patient

## 2022-07-20 NOTE — PERIOP NOTES
Reviewed PTA medication list with patient/caregiver and patient/caregiver denies any additional medications. Patient admits to having a responsible adult care for them at home for at least 24 hours after surgery. Patient encouraged to use gown warming system and informed that using said warming gown to regulate body temperature prior to a procedure has been shown to help reduce the risks of blood clots and infection. Patient's pharmacy of choice verified and documented in PTA medication section. Dual skin assessment & fall risk band verification completed with JOANNE Lewis.

## 2022-07-21 VITALS
OXYGEN SATURATION: 98 % | WEIGHT: 207.6 LBS | HEIGHT: 59 IN | HEART RATE: 67 BPM | BODY MASS INDEX: 41.85 KG/M2 | DIASTOLIC BLOOD PRESSURE: 75 MMHG | TEMPERATURE: 97.7 F | RESPIRATION RATE: 18 BRPM | SYSTOLIC BLOOD PRESSURE: 132 MMHG

## 2022-07-21 PROCEDURE — 97116 GAIT TRAINING THERAPY: CPT

## 2022-07-21 PROCEDURE — 74011000250 HC RX REV CODE- 250: Performed by: PHYSICIAN ASSISTANT

## 2022-07-21 PROCEDURE — 74011250636 HC RX REV CODE- 250/636: Performed by: PHYSICIAN ASSISTANT

## 2022-07-21 PROCEDURE — G0378 HOSPITAL OBSERVATION PER HR: HCPCS

## 2022-07-21 PROCEDURE — 74011250637 HC RX REV CODE- 250/637: Performed by: PHYSICIAN ASSISTANT

## 2022-07-21 RX ORDER — NALOXONE HYDROCHLORIDE 4 MG/.1ML
SPRAY NASAL
Qty: 1 EACH | Refills: 0 | Status: SHIPPED | OUTPATIENT
Start: 2022-07-21

## 2022-07-21 RX ORDER — OXYCODONE AND ACETAMINOPHEN 5; 325 MG/1; MG/1
1-2 TABLET ORAL
Qty: 56 TABLET | Refills: 0 | Status: SHIPPED | OUTPATIENT
Start: 2022-07-21 | End: 2022-07-28

## 2022-07-21 RX ADMIN — APIXABAN 2.5 MG: 2.5 TABLET, FILM COATED ORAL at 08:43

## 2022-07-21 RX ADMIN — GABAPENTIN 300 MG: 300 CAPSULE ORAL at 08:43

## 2022-07-21 RX ADMIN — PANTOPRAZOLE SODIUM 40 MG: 40 TABLET, DELAYED RELEASE ORAL at 06:33

## 2022-07-21 RX ADMIN — APIXABAN 2.5 MG: 2.5 TABLET, FILM COATED ORAL at 03:24

## 2022-07-21 RX ADMIN — CEFAZOLIN 2 G: 10 INJECTION, POWDER, FOR SOLUTION INTRAVENOUS at 03:24

## 2022-07-21 RX ADMIN — OXYCODONE 10 MG: 5 TABLET ORAL at 08:43

## 2022-07-21 RX ADMIN — OXYCODONE 10 MG: 5 TABLET ORAL at 12:14

## 2022-07-21 RX ADMIN — OXYCODONE 10 MG: 5 TABLET ORAL at 03:23

## 2022-07-21 RX ADMIN — LOSARTAN POTASSIUM: 50 TABLET, FILM COATED ORAL at 08:43

## 2022-07-21 RX ADMIN — FERROUS SULFATE TAB 325 MG (65 MG ELEMENTAL FE) 325 MG: 325 (65 FE) TAB at 08:43

## 2022-07-21 RX ADMIN — KETOROLAC TROMETHAMINE 15 MG: 30 INJECTION, SOLUTION INTRAMUSCULAR; INTRAVENOUS at 06:33

## 2022-07-21 RX ADMIN — METOPROLOL SUCCINATE 50 MG: 50 TABLET, EXTENDED RELEASE ORAL at 08:43

## 2022-07-21 RX ADMIN — ATORVASTATIN CALCIUM 20 MG: 20 TABLET, FILM COATED ORAL at 08:43

## 2022-07-21 RX ADMIN — Medication 1 TABLET: at 08:43

## 2022-07-21 NOTE — NURSE NAVIGATOR
Paramgabby Gramajo Rounded on post total knee replacement. Patient educated: Activity:   OOB for all meals,   Walk short distances every hour during the day and evening to promote circulation, help move better and lessen stiffness. Also helps to get the muscles stretched and strong. When elevating leg and sitting do not put anything under knee. IT is important to work on getting the leg stretched out and as straight as possible. Promoting circulation  Ankle pumps 10 times an hour at hospital & home. Take medications as prescribed by provider. Pain Control:  Pain medications side effects of constipation, nausea, dizziness, itching reviewed. Reminded patient swelling, bruising and increased pain are normal at home. To help decrease swelling after surgery it is safe to lie down and elevate legs above heart to help decrease swelling. Use pillows while keeping the surgical knee straight when elevating. Use ice, distraction, moving, & change position to help with pain. Rest between activity. Educated that medications can cause nausea and decreased appetite so eat a snack before taking medication. Narcotics cause constipation so take stool softener/mild laxative daily while on narcotics. Incentive Spirometry:    Use of incentive spirometer 10 x/hr. Diet:   Eat for healing. Drink plenty of fluids so urine is lemonade in color. Patient Safety:   Call light & belongings in reach. Call for help when want to walk or get OOB. Param Gramajo verbalized understand. Given the opportunity for asking questions.       Nurse Navigator

## 2022-07-21 NOTE — PROGRESS NOTES
Verbal and Bedside change of shift report given to Wade Griffin RN by Ryan Serrano RN. Opportunity for questions were provided. Sudhir Jesus care of patient. Assessment complete at this time. Pt alert and oriented x 4. Lungs clear bilaterally on room air. Patient denies shortness of breath/chest pain. No numbness and tingling in all extremities. 18G IV to L. Hand dressing clean, dry, & intact. Stated pain 6/10. SCD's/ Jaylen hose in place. AE,ABD dressing to L. Knee that is clean, dry, & intact. Incentive spirometer at bedside. Patient taught how to use breathing tool w/ proper demonstration. Instructed to use 10x Q1. Verbalized understanding. Last BM 07/19/2022. Patient oriented to room and call bell. Call bell within reach. Bed in lowest position. 0843  PRN 10 mg Oral Roxicodone given for 8/10 paint to the L. Knee.

## 2022-07-21 NOTE — PROGRESS NOTES
Problem: Mobility Impaired (Adult and Pediatric)  Goal: *Acute Goals and Plan of Care (Insert Text)  Description: Goals to be addressed in 1-3 days:  1. Supine to sit and sit to supine SBA with HR for meals. 2. Sit to stand and stand to sit SBA/CGA with RW in prep for ambulation. 3. Ambulate 100ft SBA/CGA with RW, WBAT, for home/community mobility. 4. Ascend/descend a 1 stair steps CGA/Mak with HR for home entry. Note: [x]  Patient has met MD yvette flores for d/c home   []  Recommend HH with 24 hour adult care   []  Benefit from additional acute PT session to address:      PHYSICAL THERAPY TREATMENT    Patient: Elías Garcia (89 y.o. female)  Date: 7/21/2022  Diagnosis: Total knee replacement status, left [Z96.652] <principal problem not specified>  Procedure(s) (LRB):  LEFT TOTAL KNEE ARTHROPLASTY AND ALL INDICATED PROCEDURES \"SPEC POP\" (Left) 1 Day Post-Op  Precautions: Fall, WBAT (Simultaneous filing. User may not have seen previous data.)    ASSESSMENT:  Pt demonstrated excellent progress with mobility. Pt performed bed mobility, transfers, ambulation with RW and supervision. No LOB or unsteadiness noted during session. Reviewed home safety, activity recommendation, icing, positioning, home safety, mobility recommendations and home exercise program. Recommend transition to home with home health PT and family assistance. Progression toward goals:   [x]      Improving appropriately and progressing toward goals  []      Improving slowly and progressing toward goals  []      Not making progress toward goals and plan of care will be adjusted     PLAN:  Patient continues to benefit from skilled intervention to address the above impairments. Continue treatment per established plan of care. Discharge Recommendations:  Home Health  Further Equipment Recommendations for Discharge:  N/A     SUBJECTIVE:   Patient stated I am doing great.     OBJECTIVE DATA SUMMARY:   Critical Behavior:  Neurologic State: Alert, Appropriate for age  Orientation Level: Appropriate for age, Oriented X4  Cognition: Appropriate for age attention/concentration, Appropriate decision making, Appropriate safety awareness, Follows commands  Safety/Judgement: Fall prevention  Functional Mobility Training:  Bed Mobility:   Supine to Sit: Supervision  Transfers:  Sit to Stand: Stand-by assistance  Stand to Sit: Stand-by assistance  Balance:  Sitting: Intact  Standing: Intact; With support   Ambulation/Gait Training:  Distance (ft): 150 Feet (ft)  Assistive Device: Gait belt;Walker, rolling  Gait Abnormalities: Decreased step clearance  Left Side Weight Bearing: As tolerated  Base of Support: Shift to right  Speed/Yumi: Slow  Step Length: Right shortened;Left shortened    Pain:  Pain level pre-treatment: 3/10  Pain level post-treatment: 3/10   Pain Intervention(s): Medication (see MAR); Rest, Ice, Repositioning   Response to intervention: Nurse notified, See doc flow    Activity Tolerance:   Good  Please refer to the flowsheet for vital signs taken during this treatment. After treatment:   [] Patient left in no apparent distress sitting up in chair  [x] Patient left in no apparent distress in bed  [x] Call bell left within reach  [x] Nursing notified  [] Caregiver present  [] Bed alarm activated  [] SCDs applied      COMMUNICATION/EDUCATION:   [x]         Role of Physical Therapy in the acute care setting. [x]         Fall prevention education was provided and the patient/caregiver indicated understanding. [x]         Patient/family have participated as able in working toward goals and plan of care. []         Patient/family agree to work toward stated goals and plan of care. []         Patient understands intent and goals of therapy, but is neutral about his/her participation.   []         Patient is unable to participate in stated goals/plan of care: ongoing with therapy staff.  []         Other:        [facility]   Time Calculation: 12 mins

## 2022-07-21 NOTE — PROGRESS NOTES
Progress Note        Patient: Carlos Enrique Goldstein MRN: [id number]  SSN: [id number]    YOB: 1965  Age: 55 y.o. Sex: female      1 Day Post-Op status post Procedure(s) (LRB):  LEFT TOTAL KNEE ARTHROPLASTY AND ALL INDICATED PROCEDURES \"SPEC POP\" (Left)    Admit Date: 2022  Admit Diagnosis: Total knee replacement status, left [Z96.652]    Subjective:      POD#1 Doing well. No complaints. No SOB. No Chest Pain. No Nausea or Vomiting. No problems eating or voiding. No O/N events    Objective:        Temp (24hrs), Av.7 °F (36.5 °C), Min:97.2 °F (36.2 °C), Max:98.3 °F (36.8 °C)    Body mass index is 41.93 kg/m². Patient Vitals for the past 12 hrs:   BP Temp Pulse Resp SpO2   22 0715 132/75 97.7 °F (36.5 °C) 67 18 98 %   22 0333 (!) 141/70 97.6 °F (36.4 °C) 67 18 98 %   22 2338 (!) 140/71 97.6 °F (36.4 °C) 66 18 --   22 2019 132/71 98 °F (36.7 °C) 71 18 99 %     No results for input(s): HGB, HCT, INR, NA, K, CL, CO2, BUN, CREA, GLU, HGBEXT, HCTEXT, INREXT in the last 72 hours. Physical Exam:  Vital Signs are Stable, with exception of 2 episodes of hypertension. Otherwise stable. No Acute Distress. Alert and Oriented. Negative Homans sign. Toes AROM Full. Neurovascular exam is normal.    Dressing is Clean, Dry, and Intact. Assessment/Plan:     Patient doing well. POD#1  Out of BED / PT / WBAT  DVT ppx: Mobilization, Eliquis, ROSA ELENA hose, and mechanical compression  D/c today pending approval from PT/OT and adequate analgesia management.     Continue PT/OT  Continue ROM    Discharge Plan: Home    Signed By: Jose Coburn PA-C     2022

## 2023-03-02 NOTE — PROGRESS NOTES
D/C plan: Home w/ [facility]. Pt's dtr to transport. CM met w/ pt at bedside. Confirmed pt's information on the face sheet. Confirmed pt has her RW. Pt lives home alone. Her dtr will stay with her for 3 weeks to assist with her recovery. Transition of Care (FELISHA) Plan:          Pt admitted for an elective surgical procedure. LEFT TOTAL KNEE ARTHROPLASTY AND ALL INDICATED PROCEDURES \"SPEC POP\" Pt is independent. Please encourage ambulation. No transition of care needs identified at this time. Anticipate pt will be medically stable for discharge within the next 24-48 hours with physician follow up. CM available to assist as needed. FELISHA Transportation:   How is patient being transported at discharge? Dtr      When? Once cleared by physician     Is transport scheduled? N/A      Follow-up appointment and transportation:   PCP/Specialist?  See AVS for Appointment         Who is transporting to the follow-up appointment? dtr      Is transport for follow up appointment scheduled? N/A    Communication plan (with patient/family): Who is being called? Patient   Responsible party? Patient      What number(s) is to be used? See Facesheet      What service provider is calling for [facility]? [facility]           When are they calling? Within 24 to 48 hours. Readmission Risk? (Green/Low; Yellow/Moderate; Red/High):  [employer] here to complete [address] including selection of the Healthcare Decision Maker Relationship (ie \"Primary\")      Care Management Interventions  PCP Verified by CM: Yes  Mode of Transport at Discharge:  Other (see comment) (The pt's dtr will transport. )  Transition of Care Consult (CM Consult): [address]: No  Reason Outside [place]: Physician referred to specific agency  Discharge Durable Medical Equipment: No (Pt has RW)  Physical Therapy Consult: Yes  Occupational Therapy Consult: Yes  Support Just saw this encounter. Should these labs be orders? Does patient need a phone call?    Systems: Child(traci) (Pt's dtr; Nestor Parsons )  Confirm Follow Up Transport: Family  The Plan for Transition of Care is Related to the Following Treatment Goals : home w/ [facility]  The Patient and/or Patient Representative was Provided with a Choice of Provider and Agrees with the Discharge Plan?: Yes  Freedom of Choice List was Provided with Basic Dialogue that Supports the Patient's Individualized Plan of Care/Goals, Treatment Preferences and Shares the Quality Data Associated with the Providers?: Yes (Home w/ [facility]. )  Discharge Location  Patient Expects to be Discharged to[de-identified] Home with home health (Simultaneous filing.  User may not have seen previous data.)

## 2025-05-08 ENCOUNTER — HOSPITAL ENCOUNTER (OUTPATIENT)
Facility: HOSPITAL | Age: 60
Discharge: HOME | End: 2025-05-11
Attending: INTERNAL MEDICINE
Payer: MEDICAID

## 2025-05-08 DIAGNOSIS — M25.431 EFFUSION OF RIGHT WRIST: ICD-10-CM

## 2025-05-08 PROCEDURE — 73221 MRI JOINT UPR EXTREM W/O DYE: CPT

## (undated) DEVICE — SUTURE STRATAFIX SZ 1 L14IN ABSRB VLT L36MM MO-4 TAPERPOINT SXPD2B400

## (undated) DEVICE — SHEET,DRAPE,70X100,STERILE: Brand: MEDLINE

## (undated) DEVICE — BLADE SAW W13XL90MM 1.19MM PARA

## (undated) DEVICE — UNDERCAST PADDING: Brand: DEROYAL

## (undated) DEVICE — ROCKER SWITCH PENCIL HOLSTER: Brand: VALLEYLAB

## (undated) DEVICE — SPONGE LAP 18X18IN STRL -- 5/PK

## (undated) DEVICE — SUT VCRL + 0 36IN CT1 UD --

## (undated) DEVICE — TOWEL,OR,DSP,ST,BLUE,STD,4/PK,20PK/CS: Brand: MEDLINE

## (undated) DEVICE — SUT VCRL + 1 36IN CT1 VIO --

## (undated) DEVICE — PEEL-AWAY TOGA, X-LARGE: Brand: FLYTE

## (undated) DEVICE — HANDPIECE SET WITH HIGH FLOW TIP AND SUCTION TUBE: Brand: INTERPULSE

## (undated) DEVICE — ZIMMER® STERILE DISPOSABLE TOURNIQUET CUFF WITH PROTECTIVE SLEEVE AND PLC, SINGLE PORT, SINGLE BLADDER, 34 IN. (86 CM)

## (undated) DEVICE — 3 BONE CEMENT MIXER: Brand: MIXEVAC

## (undated) DEVICE — BLADE SAW 1.19X20X90 MM FOR LG BNE

## (undated) DEVICE — Z INACTIVE USE 2720250 BIT DRL 3.2X145MM

## (undated) DEVICE — STERILE POLYISOPRENE POWDER-FREE SURGICAL GLOVES WITH EMOLLIENT COATING: Brand: PROTEXIS

## (undated) DEVICE — OPTIFOAM GENTLE SA, POSTOP, 4X12: Brand: MEDLINE

## (undated) DEVICE — SYSTEM SKIN CLSR 22CM DERMBND PRINEO

## (undated) DEVICE — HEX-LOCKING BLADE ELECTRODE: Brand: EDGE

## (undated) DEVICE — SYR 10ML CTRL LR LCK NSAF LF --

## (undated) DEVICE — GLOVE SURG SZ 75 L12IN FNGR THK79MIL GRN LTX FREE

## (undated) DEVICE — GLOVE ORANGE PI 8 1/2   MSG9085

## (undated) DEVICE — GLOVE ORANGE PI 7 1/2   MSG9075

## (undated) DEVICE — SOL IRR NACL 0.9% 500ML POUR --

## (undated) DEVICE — SUT VCRL + 3-0 27IN CT2 UD --

## (undated) DEVICE — SOL INJ L R 1000ML BG --

## (undated) DEVICE — SUT MCRYL + 3-0 27IN PS1 UD --

## (undated) DEVICE — GARMENT COMPR M FOR 13IN FT INTMIT SGL BLDR HEM FORC II

## (undated) DEVICE — NEEDLE HYPO 22GA L1.5IN BLK S STL HUB POLYPR SHLD REG BVL

## (undated) DEVICE — HOOD, PEEL-AWAY: Brand: FLYTE

## (undated) DEVICE — STRIP,CLOSURE,WOUND,MEDI-STRIP,1/2X4: Brand: MEDLINE

## (undated) DEVICE — PREP SKN CHLRAPRP APL 26ML STR --

## (undated) DEVICE — PACK PROCEDURE SURG TOT KNEE CUST